# Patient Record
Sex: FEMALE | Race: WHITE | NOT HISPANIC OR LATINO | Employment: FULL TIME | URBAN - METROPOLITAN AREA
[De-identification: names, ages, dates, MRNs, and addresses within clinical notes are randomized per-mention and may not be internally consistent; named-entity substitution may affect disease eponyms.]

---

## 2019-08-28 ENCOUNTER — OFFICE VISIT (OUTPATIENT)
Dept: OBGYN CLINIC | Facility: CLINIC | Age: 39
End: 2019-08-28
Payer: COMMERCIAL

## 2019-08-28 VITALS
SYSTOLIC BLOOD PRESSURE: 122 MMHG | HEIGHT: 61 IN | BODY MASS INDEX: 20.86 KG/M2 | WEIGHT: 110.5 LBS | DIASTOLIC BLOOD PRESSURE: 72 MMHG

## 2019-08-28 DIAGNOSIS — Z12.4 ROUTINE CERVICAL SMEAR: ICD-10-CM

## 2019-08-28 DIAGNOSIS — Z11.51 SCREENING FOR HPV (HUMAN PAPILLOMAVIRUS): ICD-10-CM

## 2019-08-28 DIAGNOSIS — Z01.419 ENCOUNTER FOR WELL WOMAN EXAM: Primary | ICD-10-CM

## 2019-08-28 PROCEDURE — 99385 PREV VISIT NEW AGE 18-39: CPT | Performed by: PHYSICIAN ASSISTANT

## 2019-08-28 NOTE — PROGRESS NOTES
Assessment/Plan:    No problem-specific Assessment & Plan notes found for this encounter  Diagnoses and all orders for this visit:    Encounter for well woman exam    Routine cervical smear  -     GP Pap Dependent HPV Cotest >= 27years old    Screening for HPV (human papillomavirus)  -     GP Pap Dependent HPV Cotest >= 27years old          Subjective:      Patient ID: Porter Wild is a 45 y o  female  Pt presents for her annual exam today--  She has no complaints  She has regular, light bleeding, no pelvic pain  Bowel and bladder are regular  No breast concerns today    pap today  (period started last night)        The following portions of the patient's history were reviewed and updated as appropriate: allergies, current medications, past family history, past medical history, past social history, past surgical history and problem list     Review of Systems   Constitutional: Negative for chills, fever and unexpected weight change  Gastrointestinal: Negative for abdominal pain, blood in stool, constipation and diarrhea  Genitourinary: Negative  Objective:      /72 (BP Location: Left arm, Patient Position: Sitting, Cuff Size: Standard)   Ht 5' 0 63" (1 54 m)   Wt 50 1 kg (110 lb 8 oz)   LMP 08/28/2019 (Exact Date)   BMI 21 13 kg/m²          Physical Exam   Constitutional: She appears well-developed and well-nourished  HENT:   Head: Normocephalic and atraumatic  Neck: Normal range of motion  Pulmonary/Chest: Right breast exhibits no inverted nipple, no mass, no nipple discharge and no skin change  Left breast exhibits no inverted nipple, no mass, no nipple discharge and no skin change  Abdominal: Soft  Genitourinary: Vagina normal and uterus normal  Pelvic exam was performed with patient supine  There is no rash, tenderness or lesion on the right labia  There is no rash, tenderness or lesion on the left labia  Cervix exhibits discharge (active BRB)   Cervix exhibits no motion tenderness and no friability  Right adnexum displays no mass, no tenderness and no fullness  Left adnexum displays no mass, no tenderness and no fullness  Lymphadenopathy: No inguinal adenopathy noted on the right or left side  Nursing note and vitals reviewed

## 2019-08-28 NOTE — PROGRESS NOTES
The patient is here for a yearly  The patient has regular periods  No vaginal or urinary issues  No breast concerns  The patient used to live in [place], [place] and has not been to a gynecologist for five years

## 2019-09-03 LAB
HPV HR 12 DNA CVX QL NAA+PROBE: NOT DETECTED
HPV16 DNA SPEC QL NAA+PROBE: NOT DETECTED
HPV18 DNA SPEC QL NAA+PROBE: NOT DETECTED
THIN PREP CVX: NORMAL

## 2020-09-09 ENCOUNTER — ANNUAL EXAM (OUTPATIENT)
Dept: OBGYN CLINIC | Facility: CLINIC | Age: 40
End: 2020-09-09
Payer: COMMERCIAL

## 2020-09-09 VITALS
HEIGHT: 60 IN | BODY MASS INDEX: 21.6 KG/M2 | SYSTOLIC BLOOD PRESSURE: 110 MMHG | DIASTOLIC BLOOD PRESSURE: 68 MMHG | WEIGHT: 110 LBS

## 2020-09-09 DIAGNOSIS — Z12.39 ENCOUNTER FOR SCREENING BREAST EXAMINATION: ICD-10-CM

## 2020-09-09 DIAGNOSIS — Z01.419 ENCOUNTER FOR WELL WOMAN EXAM: Primary | ICD-10-CM

## 2020-09-09 PROCEDURE — 99395 PREV VISIT EST AGE 18-39: CPT | Performed by: PHYSICIAN ASSISTANT

## 2020-09-09 RX ORDER — DIPHENOXYLATE HYDROCHLORIDE AND ATROPINE SULFATE 2.5; .025 MG/1; MG/1
1 TABLET ORAL DAILY
COMMUNITY

## 2020-09-09 NOTE — PROGRESS NOTES
Assessment/Plan:    No problem-specific Assessment & Plan notes found for this encounter  Diagnoses and all orders for this visit:    Encounter for well woman exam    Encounter for screening breast examination    Other orders  -     multivitamin (THERAGRAN) TABS; Take 1 tablet by mouth daily          Subjective:      Patient ID: River Burris is a 44 y o  female  Pt presents for her annual exam today--  She has no complaints  She has regular bleeding, no pelvic pain  Bowel and bladder are regular  No breast concerns today  Last mammo--will be due for 1st this year    No pap today  rx--will take at next OV  Daily mvi      The following portions of the patient's history were reviewed and updated as appropriate: allergies, current medications, past family history, past medical history, past social history, past surgical history and problem list     Review of Systems   Constitutional: Negative for chills, fever and unexpected weight change  Gastrointestinal: Negative for abdominal pain, blood in stool, constipation and diarrhea  Genitourinary: Negative  Objective:      /68   Ht 5' (1 524 m)   Wt 49 9 kg (110 lb)   LMP 09/09/2020   BMI 21 48 kg/m²          Physical Exam  Vitals signs and nursing note reviewed  Constitutional:       Appearance: She is well-developed  HENT:      Head: Normocephalic and atraumatic  Neck:      Musculoskeletal: Normal range of motion  Chest:      Breasts:         Right: No inverted nipple, mass, nipple discharge or skin change  Left: No inverted nipple, mass, nipple discharge or skin change  Abdominal:      Palpations: Abdomen is soft  Genitourinary:     Exam position: Supine  Labia:         Right: No rash, tenderness or lesion  Left: No rash, tenderness or lesion  Vagina: Normal       Cervix: No cervical motion tenderness, discharge or friability  Adnexa:         Right: No mass, tenderness or fullness  Left: No mass, tenderness or fullness  Lymphadenopathy:      Lower Body: No right inguinal adenopathy  No left inguinal adenopathy

## 2020-09-09 NOTE — PROGRESS NOTES
Patient is here for yearly exam  Patient is having regular cycles, no breast concerns and B&B ok  Patient is not due for a pap smear at this visit  8/28/19 Normal Pap, Negative HPV

## 2021-04-01 DIAGNOSIS — Z23 ENCOUNTER FOR IMMUNIZATION: ICD-10-CM

## 2021-04-12 ENCOUNTER — IMMUNIZATIONS (OUTPATIENT)
Dept: FAMILY MEDICINE CLINIC | Facility: HOSPITAL | Age: 41
End: 2021-04-12

## 2021-04-12 DIAGNOSIS — Z23 ENCOUNTER FOR IMMUNIZATION: Primary | ICD-10-CM

## 2021-04-12 PROCEDURE — 91300 SARS-COV-2 / COVID-19 MRNA VACCINE (PFIZER-BIONTECH) 30 MCG: CPT

## 2021-04-12 PROCEDURE — 0001A SARS-COV-2 / COVID-19 MRNA VACCINE (PFIZER-BIONTECH) 30 MCG: CPT

## 2021-05-03 ENCOUNTER — IMMUNIZATIONS (OUTPATIENT)
Dept: FAMILY MEDICINE CLINIC | Facility: HOSPITAL | Age: 41
End: 2021-05-03

## 2021-05-03 DIAGNOSIS — Z23 ENCOUNTER FOR IMMUNIZATION: Primary | ICD-10-CM

## 2021-05-03 PROCEDURE — 91300 SARS-COV-2 / COVID-19 MRNA VACCINE (PFIZER-BIONTECH) 30 MCG: CPT

## 2021-05-03 PROCEDURE — 0002A SARS-COV-2 / COVID-19 MRNA VACCINE (PFIZER-BIONTECH) 30 MCG: CPT

## 2021-08-26 ENCOUNTER — OFFICE VISIT (OUTPATIENT)
Dept: PHYSICAL THERAPY | Facility: CLINIC | Age: 41
End: 2021-08-26
Payer: COMMERCIAL

## 2021-08-26 DIAGNOSIS — M25.561 ACUTE PAIN OF RIGHT KNEE: Primary | ICD-10-CM

## 2021-08-26 PROCEDURE — 97162 PT EVAL MOD COMPLEX 30 MIN: CPT | Performed by: PHYSICAL THERAPIST

## 2021-08-26 NOTE — PROGRESS NOTES
PT Evaluation     Today's date: 2021  Patient name: Shashank Madsen  : 1980  MRN: [id number]  Referring provider: Jennifer Mckeon MD  Dx:   Encounter Diagnosis     ICD-10-CM    1  Acute pain of right knee  M25 561                   Assessment  Assessment details: Pt presents with s/s consistent with R PFPS  Pt will benefit from PT to address impairments and restore function  Pt is utilizing her direct access benefits and will be referred to her PCP if pain is not resolved within 30 days  Impairments: abnormal gait, abnormal or restricted ROM, abnormal movement, activity intolerance, impaired physical strength, lacks appropriate home exercise program, pain with function and poor body mechanics    Goals  ST-4 weeks  1   Pt will decrease pain > 50%  2   Pt will restore full ROM  LT-6 weeks  1   Pt will decrease pain > 75%  2   Pt will return to running 6 mi without pain  Plan  Planned modality interventions: low level laser therapy  Planned therapy interventions: joint mobilization, manual therapy, abdominal trunk stabilization, neuromuscular re-education, patient education, postural training, strengthening, stretching, therapeutic activities, therapeutic exercise, therapeutic training, flexibility, functional ROM exercises, body mechanics training, gait training, home exercise program and graded activity  Frequency: 2x week  Duration in weeks: 4        Subjective Evaluation    History of Present Illness  Mechanism of injury: Pt is a 36 yof c/o an insidious onset of R medial knee pain that began 5 weeks ago after a long run  Pt has been running for 20 years and typically runs 4 days per week with an average mileage ranging from 25-30  Pt reports typically competing between 4-5 1/2 marathon's per year      Pain  Current pain ratin  At best pain ratin  At worst pain ratin  Quality: dull ache  Relieving factors: rest and medications  Aggravating factors: running and stair climbing      Diagnostic Tests  No diagnostic tests performed  Patient Goals  Patient goals for therapy: decreased edema, decreased pain, improved balance, increased motion, increased strength, independence with ADLs/IADLs and return to sport/leisure activities          Objective     Passive Range of Motion   Left Knee   Flexion: 150 degrees   Extension: -10 degrees     Right Knee   Flexion: 140 degrees with pain  Extension: -2 degrees     Mobility   Patellar Mobility:   Left Knee   WFL: medial, lateral, superior and inferior  Right Knee   WFL: medial, lateral, superior and inferior    Strength/Myotome Testing     Left Knee   Flexion: 5  Extension: 5  Quadriceps contraction: good    Right Knee   Flexion: 4  Extension: 4-  Quadriceps contraction: good    Additional Strength Details  Gait: Pt demonstrated mild R hip ER with a medial heel whip during late stance  Callus formation on R foot indicates mid-foot push-off  Tests     Right Knee   Positive patella-femoral grind, Thessaly's test at 20 degrees and valgus stress test at 0 degrees  Negative lateral Kimberlee, medial Kimberlee, patellar compression and Thessaly's test at 5 degrees  Dx: R PFPS        Manuals 8/26            Laser R knee  4000J            Medial patella taping                                       Neuro Re-Ed             DLS standing hip ABD 3x10 ea            DL ecc heel raises: even push-off 1x10            Standing GA stretch maintaining arch height 15"x3            DLS: SLR 0N58 ea                                                   Ther Ex             SL hip ABD             DL squat                                                                                           Ther Activity                                       Gait Training             Alter G running                          Modalities

## 2021-08-31 ENCOUNTER — APPOINTMENT (OUTPATIENT)
Dept: PHYSICAL THERAPY | Facility: CLINIC | Age: 41
End: 2021-08-31
Payer: COMMERCIAL

## 2021-09-02 ENCOUNTER — OFFICE VISIT (OUTPATIENT)
Dept: PHYSICAL THERAPY | Facility: CLINIC | Age: 41
End: 2021-09-02
Payer: COMMERCIAL

## 2021-09-02 DIAGNOSIS — M25.561 ACUTE PAIN OF RIGHT KNEE: Primary | ICD-10-CM

## 2021-09-02 PROCEDURE — 97112 NEUROMUSCULAR REEDUCATION: CPT | Performed by: PHYSICAL THERAPIST

## 2021-09-02 PROCEDURE — 97116 GAIT TRAINING THERAPY: CPT | Performed by: PHYSICAL THERAPIST

## 2021-09-02 PROCEDURE — 97140 MANUAL THERAPY 1/> REGIONS: CPT | Performed by: PHYSICAL THERAPIST

## 2021-09-02 NOTE — PROGRESS NOTES
Daily Note     Today's date: 2021  Patient name: Caridad Louise  : 1980  MRN: [id number]  Referring provider: Vidhi Alberts MD  Dx:   Encounter Diagnosis     ICD-10-CM    1  Acute pain of right knee  M25 561                   Subjective: Pt reports good compliance with HEP  Pt reports 8/10 R knee pain first thing in the morning when transitioning from flexion to extension  Pt reports pain resolves after several hours, but still has pain with descending stairs and with sitting at her desk > 15-20 min  Objective: See treatment diary below  Running assessment performed  Assessment: Pt demonstrated a R medial heel whip, lateral hip instability B, a heel IC and posterior COG  Plan: Cont POC  Dx: R PFPS        Manuals            Laser R knee  4000J 5000J           Medial patella taping                                       Neuro Re-Ed             DLS standing hip ABD 3x10 ea 3x10 ea           DL ecc heel raises: even push-off 1x10 3x10           Standing GA stretch maintaining arch height 15"x3 15"x3           DLS: SLR 8K78 ea 6S02 ea                                                  Ther Ex             SL hip ABD             DL squat  3x10                                                                                         Ther Activity                                       Gait Training             Alter G running  12 min @ 65 % BW x 5 5 mph           TM  5 min x 5 5 mph           Modalities

## 2021-09-09 ENCOUNTER — OFFICE VISIT (OUTPATIENT)
Dept: PHYSICAL THERAPY | Facility: CLINIC | Age: 41
End: 2021-09-09
Payer: COMMERCIAL

## 2021-09-09 DIAGNOSIS — M25.561 ACUTE PAIN OF RIGHT KNEE: Primary | ICD-10-CM

## 2021-09-09 PROCEDURE — 97112 NEUROMUSCULAR REEDUCATION: CPT | Performed by: PHYSICAL THERAPIST

## 2021-09-09 PROCEDURE — 97116 GAIT TRAINING THERAPY: CPT | Performed by: PHYSICAL THERAPIST

## 2021-09-09 NOTE — PROGRESS NOTES
Daily Note     Today's date: 2021  Patient name: Wagner Gilbert  : 1980  MRN: [id number]  Referring provider: Brynn Serrato MD  Dx:   Encounter Diagnosis     ICD-10-CM    1  Acute pain of right knee  M25 561                   Subjective: Pt reports intermittent periods of less pain after prolonged sitting, 6/10 R ant knee pain at worst first thing in the morning  Objective: See treatment diary below  Assessment: Decreased SLR reps secondary to 3+ days of muscle soreness  Functionally, pt's L LE appears longer, issued a 1/3 cm heel lift  Plan: Cont POC  Assess response to heel lift  Dx: R PFPS        Manuals           Laser R knee  4000J 5000J 5000J          Medial patella taping   3 min                                    Neuro Re-Ed             DLS standing hip ABD 3x10 ea 3x10 ea 3x12 ea          DL ecc heel raises: even push-off 1x10 3x10 SL 2x10          Standing GA stretch maintaining arch height 15"x3 15"x3 15"x3          DLS: SLR 3C81 ea 8I00 ea 3x20 ea          bridges   3x10                                     Ther Ex             SL hip ABD             DL squat  3x10 3x10                                                                                        Ther Activity                                       Gait Training             Alter G running  12 min @ 65 % BW x 5 5 mph 12 min @ 60 % BW x 5 5 mph           TM  5 min x 5 5 mph           Modalities

## 2021-09-13 ENCOUNTER — OFFICE VISIT (OUTPATIENT)
Dept: PHYSICAL THERAPY | Facility: CLINIC | Age: 41
End: 2021-09-13
Payer: COMMERCIAL

## 2021-09-13 DIAGNOSIS — M25.561 ACUTE PAIN OF RIGHT KNEE: Primary | ICD-10-CM

## 2021-09-13 PROCEDURE — 97116 GAIT TRAINING THERAPY: CPT | Performed by: PHYSICAL THERAPIST

## 2021-09-13 PROCEDURE — 97112 NEUROMUSCULAR REEDUCATION: CPT | Performed by: PHYSICAL THERAPIST

## 2021-09-13 NOTE — PROGRESS NOTES
Daily Note     Today's date: 2021  Patient name: Sonam Valdez  : 1980  MRN: [id number]  Referring provider: Sebastian Bledsoe MD  Dx:   Encounter Diagnosis     ICD-10-CM    1  Acute pain of right knee  M25 561                   Subjective: Pt reports 3/10 pain currently  Pt reports a slight decrease in pain and tightness after using the heel lift in R shoe  Pt continues to report her worst pain first thing in the morning  Objective: See treatment diary below  Assessment: Added patella mobs instead of taping secondary to excessive soreness first thing in the morning while wearing taping  Plan: Cont POC  Assess response to patella mob, added heelslides nv  Dx: R PFPS        Manuals          Laser R knee  4000J 5000J 5000J 5000J         Medial patella taping   3 min D/c'd         Patella Gr IV medial, inf mobs    1x30 ea                      Neuro Re-Ed             DLS standing hip ABD 3x10 ea 3x10 ea 3x12 ea 3x15 ea         DL ecc heel raises: even push-off 1x10 3x10 SL 2x10 DL con/ SL ecc 3x10         Standing GA stretch maintaining arch height 15"x3 15"x3 15"x3 15"x3         DLS: SLR 9X01 ea 6A46 ea 3x20 ea 3x20 ea         bridges   3x10  3x12                                   Ther Ex             SL hip ABD             DL squat  3x10 3x10 3x10         heelslides    nv                                                                          Ther Activity                                       Gait Training             Alter G running  12 min @ 65 % BW x 5 5 mph 12 min @ 60 % BW x 5 5 mph           TM  5 min x 5 5 mph           Modalities

## 2021-09-15 ENCOUNTER — ANNUAL EXAM (OUTPATIENT)
Dept: OBGYN CLINIC | Facility: CLINIC | Age: 41
End: 2021-09-15
Payer: COMMERCIAL

## 2021-09-15 VITALS
WEIGHT: 101 LBS | SYSTOLIC BLOOD PRESSURE: 106 MMHG | BODY MASS INDEX: 19.83 KG/M2 | HEIGHT: 60 IN | DIASTOLIC BLOOD PRESSURE: 64 MMHG

## 2021-09-15 DIAGNOSIS — Z01.419 ENCOUNTER FOR WELL WOMAN EXAM: Primary | ICD-10-CM

## 2021-09-15 DIAGNOSIS — Z12.39 ENCOUNTER FOR SCREENING BREAST EXAMINATION: ICD-10-CM

## 2021-09-15 DIAGNOSIS — Z12.31 ENCOUNTER FOR SCREENING MAMMOGRAM FOR BREAST CANCER: ICD-10-CM

## 2021-09-15 PROCEDURE — 99396 PREV VISIT EST AGE 40-64: CPT | Performed by: PHYSICIAN ASSISTANT

## 2021-09-15 NOTE — PROGRESS NOTES
Assessment/Plan:    No problem-specific Assessment & Plan notes found for this encounter  Diagnoses and all orders for this visit:    Encounter for well woman exam    Encounter for screening breast examination    Encounter for screening mammogram for breast cancer  -     Mammo screening bilateral w 3d & cad; Future          Subjective:      Patient ID: Denver Hamburg is a 36 y o  female  Pt presents for her annual exam today--  She has no complaints  No changes  She has regular bleeding  no pelvic pain  Bowel and bladder are regular  No breast concerns today  Last mammo--due for 1st      No pap today  rx mammo--  Daily mvi      The following portions of the patient's history were reviewed and updated as appropriate: allergies, current medications, past family history, past medical history, past social history, past surgical history and problem list     Review of Systems   Constitutional: Negative for chills, fever and unexpected weight change  Gastrointestinal: Negative for abdominal pain, blood in stool, constipation and diarrhea  Genitourinary: Negative  Objective:      /64   Ht 5' (1 524 m)   Wt 45 8 kg (101 lb)   LMP 09/01/2021 (Exact Date)   Breastfeeding No   BMI 19 73 kg/m²          Physical Exam  Vitals and nursing note reviewed  Constitutional:       Appearance: She is well-developed  HENT:      Head: Normocephalic and atraumatic  Chest:      Breasts:         Right: No inverted nipple, mass, nipple discharge or skin change  Left: No inverted nipple, mass, nipple discharge or skin change  Abdominal:      Palpations: Abdomen is soft  Genitourinary:     Exam position: Supine  Labia:         Right: No rash, tenderness or lesion  Left: No rash, tenderness or lesion  Vagina: Normal       Cervix: No cervical motion tenderness, discharge or friability  Adnexa:         Right: No mass, tenderness or fullness            Left: No mass, tenderness or fullness  Musculoskeletal:      Cervical back: Normal range of motion  Lymphadenopathy:      Lower Body: No right inguinal adenopathy  No left inguinal adenopathy

## 2021-09-15 NOTE — PROGRESS NOTES
Patient is here for yearly exam   Patient is having regular cycle  She has no breast concerns and B&B ok  Patient is not due for a pap smear at this visit  8/28/19 Normal Pap, Negative HPV

## 2021-09-16 ENCOUNTER — OFFICE VISIT (OUTPATIENT)
Dept: PHYSICAL THERAPY | Facility: CLINIC | Age: 41
End: 2021-09-16
Payer: COMMERCIAL

## 2021-09-16 DIAGNOSIS — M25.561 ACUTE PAIN OF RIGHT KNEE: Primary | ICD-10-CM

## 2021-09-16 PROCEDURE — 97112 NEUROMUSCULAR REEDUCATION: CPT | Performed by: PHYSICAL THERAPIST

## 2021-09-16 PROCEDURE — 97116 GAIT TRAINING THERAPY: CPT | Performed by: PHYSICAL THERAPIST

## 2021-09-16 NOTE — PROGRESS NOTES
Daily Note     Today's date: 2021  Patient name: Gosia Clayton  : 1980  MRN: [id number]  Referring provider: Hiren Pisano MD  Dx:   Encounter Diagnosis     ICD-10-CM    1  Acute pain of right knee  M25 561                   Subjective: Pt reports improved R knee pain first thing in the morning after adding heelslides  Objective: See treatment diary below  Assessment:  Pt demonstrated improved knee flexion and symmetrical gait pattern  Plan: Cont POC  Dx: R PFPS        Manuals         Laser R knee  4000J 5000J 5000J 5000J 5000J        Medial patella taping   3 min D/c'd         Patella Gr IV medial, inf mobs    1x30 ea                      Neuro Re-Ed             DLS standing hip ABD 3x10 ea 3x10 ea 3x12 ea 3x15 ea 2# ea/ off step 3x10        DL ecc heel raises: even push-off 1x10 3x10 SL 2x10 DL con/ SL ecc 3x10         Standing GA stretch maintaining arch height 15"x3 15"x3 15"x3 15"x3         DLS: SLR 4M99 ea 6K32 ea 3x20 ea 3x20 ea 3x20        bridges   3x10  3x12 1x20        Bridges with a kick     3x10                     Ther Ex             bike     L1 5 min        SL hip ABD     3x15        DL squat  3x10 3x10 3x10         heelslides    nv 5"x15        Prone quad stetch     15"x3                                                             Ther Activity                                       Gait Training             Alter G running  12 min @ 65 % BW x 5 5 mph 12 min @ 60 % BW x 5 5 mph           TM  5 min x 5 5 mph           Modalities

## 2021-09-20 ENCOUNTER — OFFICE VISIT (OUTPATIENT)
Dept: PHYSICAL THERAPY | Facility: CLINIC | Age: 41
End: 2021-09-20
Payer: COMMERCIAL

## 2021-09-20 DIAGNOSIS — M25.561 ACUTE PAIN OF RIGHT KNEE: Primary | ICD-10-CM

## 2021-09-20 PROCEDURE — 97112 NEUROMUSCULAR REEDUCATION: CPT | Performed by: PHYSICAL THERAPIST

## 2021-09-20 NOTE — PROGRESS NOTES
Daily Note     Today's date: 2021  Patient name: Yamile Patrick  : 1980  MRN: [id number]  Referring provider: Gabe Cardona MD  Dx:   Encounter Diagnosis     ICD-10-CM    1  Acute pain of right knee  M25 561                   Subjective: Pt reports increased pain and stiffness for the past 2 days  Pt reports shoulder and finger swelling and pain over the past few months as well  Pt was tested and found negative for Lyme's disease and Rhuematoid arthritis  Objective: See treatment diary below  Assessment:  Pt demonstrated full ROM, improving hip and quad strength without a change in sx  Plan: Cont POC  Dx: R PFPS        Manuals        Laser R knee  4000J 5000J 5000J 5000J 5000J 5000J       Medial patella taping   3 min D/c'd         Patella Gr IV medial, inf mobs    1x30 ea         Knee flex MWM      2x10       Neuro Re-Ed             DLS standing hip ABD 3x10 ea 3x10 ea 3x12 ea 3x15 ea 2# ea/ off step 3x10 2#/ 3x15 no hands       DL ecc heel raises: even push-off 1x10 3x10 SL 2x10 DL con/ SL ecc 3x10  DL/SL ecc 3x10       Standing GA stretch maintaining arch height 15"x3 15"x3 15"x3 15"x3  15"x3       SL hip ABD      2#/ 3x15       clamshells      Hector/ 3x15       DLS: SLR 0B43 ea 3M25 ea 3x20 ea 3x20 ea 3x20 2#/ 3x15       bridges   3x10  3x12 1x20 10#/ 3x15       Bridges with a kick     3x10                     Ther Ex             bike     L1 5 min L1 5 min       SL hip ABD     3x15        DL squat  3x10 3x10 3x10         heelslides    nv 5"x15 5"x15       Prone quad stetch     15"x3                                                             Ther Activity                                       Gait Training             Alter G running  12 min @ 65 % BW x 5 5 mph 12 min @ 60 % BW x 5 5 mph           TM  5 min x 5 5 mph           Modalities

## 2021-09-23 ENCOUNTER — OFFICE VISIT (OUTPATIENT)
Dept: PHYSICAL THERAPY | Facility: CLINIC | Age: 41
End: 2021-09-23
Payer: COMMERCIAL

## 2021-09-23 DIAGNOSIS — M25.561 ACUTE PAIN OF RIGHT KNEE: Primary | ICD-10-CM

## 2021-09-23 PROCEDURE — 97116 GAIT TRAINING THERAPY: CPT | Performed by: PHYSICAL THERAPIST

## 2021-09-23 PROCEDURE — 97140 MANUAL THERAPY 1/> REGIONS: CPT | Performed by: PHYSICAL THERAPIST

## 2021-09-23 PROCEDURE — 97110 THERAPEUTIC EXERCISES: CPT | Performed by: PHYSICAL THERAPIST

## 2021-09-23 NOTE — PROGRESS NOTES
Daily Note     Today's date: 2021  Patient name: Porter Wild  : 1980  MRN: [id number]  Referring provider: Miguel Willis MD  Dx:   Encounter Diagnosis     ICD-10-CM    1  Acute pain of right knee  M25 561                   Subjective: Pt reports improved stiffness and pain since last visit  Objective: See treatment diary below  Assessment:  Pt demonstrated the most symmetrical gait pattern since beginning PT but still demonstrated minimally restricted R knee flexion during swing  Plan: Cont POC  Dx: R PFPS        Manuals       Laser R knee  4000J 5000J 5000J 5000J 5000J 5000J 5000J      Medial patella taping   3 min D/c'd         Patella Gr IV medial, inf mobs    1x30 ea   1x30      Knee flex MWM      2x10 2x10      Neuro Re-Ed             DLS standing hip ABD 3x10 ea 3x10 ea 3x12 ea 3x15 ea 2# ea/ off step 3x10 2#/ 3x15 no hands       DL ecc heel raises: even push-off 1x10 3x10 SL 2x10 DL con/ SL ecc 3x10  DL/SL ecc 3x10       Standing GA stretch maintaining arch height 15"x3 15"x3 15"x3 15"x3  15"x3                                 DLS: SLR 3Q18 ea 9I14 ea 3x20 ea 3x20 ea 3x20 2#/ 3x15 2 5#/ 3x15      bridges   3x10  3x12 1x20 10#/ 3x15       Bridges with a kick     3x10                     Ther Ex             bike     L1 5 min L1 5 min L1 x 5 min      SL hip ABD     3x15  2 5#/ 3x15      DL squat  3x10 3x10 3x10         heelslides    nv 5"x15 5"x15 5"x15      Prone quad stetch     15"x3         clamshells       Hector/ 3x15                                             Ther Activity                                       Gait Training             Alter G running  12 min @ 65 % BW x 5 5 mph 12 min @ 60 % BW x 5 5 mph     12 min @ 65 % BW 5 5 MPH      TM  5 min x 5 5 mph           Modalities

## 2021-09-28 ENCOUNTER — OFFICE VISIT (OUTPATIENT)
Dept: PHYSICAL THERAPY | Facility: CLINIC | Age: 41
End: 2021-09-28
Payer: COMMERCIAL

## 2021-09-28 DIAGNOSIS — M25.561 ACUTE PAIN OF RIGHT KNEE: Primary | ICD-10-CM

## 2021-09-28 PROCEDURE — 97530 THERAPEUTIC ACTIVITIES: CPT | Performed by: PHYSICAL THERAPIST

## 2021-09-28 PROCEDURE — 97140 MANUAL THERAPY 1/> REGIONS: CPT | Performed by: PHYSICAL THERAPIST

## 2021-09-28 PROCEDURE — 97110 THERAPEUTIC EXERCISES: CPT | Performed by: PHYSICAL THERAPIST

## 2021-09-28 NOTE — PROGRESS NOTES
Daily Note     Today's date: 2021  Patient name: Roly Helms  : 1980  MRN: [id number]  Referring provider: Yuly Loera MD  Dx:   Encounter Diagnosis     ICD-10-CM    1  Acute pain of right knee  M25 561                   Subjective: Pt reports a failure to improve significantly since beginning PT 3 and 1/2 weeks ago  Pt reports stiffness in the morning, pain with descending stairs and pain t/o the day with STS after prolonged sitting  Pt is more significant without Ibeprofen  Objective: See treatment diary below  Assessment:  Pt demonstrated (-) medial Kimberlee, (-) Jaron's sign and patella compression  Pt's pain is across her patella and at her medial joint line  Pt was recommended to f/u with PCP and would benefit from imaging to determine if articular cartilage or meniscus tear is present  Plan: Cont POC  Dx: R PFPS        Manuals      Laser R knee  4000J 5000J 5000J 5000J 5000J 5000J 5000J 6000J     Medial patella taping   3 min D/c'd         Patella Gr IV medial, inf mobs    1x30 ea   1x30 1x30     Knee flex MWM      2x10 2x10 2x10     Standing knee flexion MWM        1x10     Neuro Re-Ed             DLS standing hip ABD 3x10 ea 3x10 ea 3x12 ea 3x15 ea 2# ea/ off step 3x10 2#/ 3x15 no hands       DL ecc heel raises: even push-off 1x10 3x10 SL 2x10 DL con/ SL ecc 3x10  DL/SL ecc 3x10  DL/SL 3x10     Standing GA stretch maintaining arch height 15"x3 15"x3 15"x3 15"x3  15"x3  15"x3                               DLS: SLR 6E59 ea 9J02 ea 3x20 ea 3x20 ea 3x20 2#/ 3x15 2 5#/ 3x15      bridges   3x10  3x12 1x20 10#/ 3x15       Bridges with a kick     3x10                     Ther Ex             bike     L1 5 min L1 5 min L1 x 5 min L1 x 5 min     SL hip ABD     3x15  2 5#/ 3x15      DL squat  3x10 3x10 3x10         heelslides    nv 5"x15 5"x15 5"x15 5"x15     Prone quad stetch     15"x3         clamshells       Hector/ 3x15 Ther Activity             ecc step-downs        4"/ 1x10     ecc step-downs        6"/ 3x10     Gait Training             Alter G running  12 min @ 65 % BW x 5 5 mph 12 min @ 60 % BW x 5 5 mph     12 min @ 65 % BW 5 5 MPH      TM  5 min x 5 5 mph           Modalities

## 2021-09-30 ENCOUNTER — OFFICE VISIT (OUTPATIENT)
Dept: PHYSICAL THERAPY | Facility: CLINIC | Age: 41
End: 2021-09-30
Payer: COMMERCIAL

## 2021-09-30 DIAGNOSIS — M25.561 ACUTE PAIN OF RIGHT KNEE: Primary | ICD-10-CM

## 2021-09-30 PROCEDURE — 97530 THERAPEUTIC ACTIVITIES: CPT | Performed by: PHYSICAL THERAPIST

## 2021-09-30 PROCEDURE — 97140 MANUAL THERAPY 1/> REGIONS: CPT | Performed by: PHYSICAL THERAPIST

## 2021-09-30 PROCEDURE — 97110 THERAPEUTIC EXERCISES: CPT | Performed by: PHYSICAL THERAPIST

## 2021-10-05 ENCOUNTER — OFFICE VISIT (OUTPATIENT)
Dept: PHYSICAL THERAPY | Facility: CLINIC | Age: 41
End: 2021-10-05
Payer: COMMERCIAL

## 2021-10-05 ENCOUNTER — TRANSCRIBE ORDERS (OUTPATIENT)
Dept: URGENT CARE | Facility: MEDICAL CENTER | Age: 41
End: 2021-10-05

## 2021-10-05 ENCOUNTER — APPOINTMENT (OUTPATIENT)
Dept: RADIOLOGY | Facility: MEDICAL CENTER | Age: 41
End: 2021-10-05
Payer: COMMERCIAL

## 2021-10-05 DIAGNOSIS — M25.561 ACUTE PAIN OF RIGHT KNEE: ICD-10-CM

## 2021-10-05 DIAGNOSIS — M25.561 ACUTE PAIN OF RIGHT KNEE: Primary | ICD-10-CM

## 2021-10-05 PROCEDURE — 97140 MANUAL THERAPY 1/> REGIONS: CPT | Performed by: PHYSICAL THERAPIST

## 2021-10-05 PROCEDURE — 97110 THERAPEUTIC EXERCISES: CPT | Performed by: PHYSICAL THERAPIST

## 2021-10-05 PROCEDURE — 73562 X-RAY EXAM OF KNEE 3: CPT

## 2021-10-07 ENCOUNTER — OFFICE VISIT (OUTPATIENT)
Dept: PHYSICAL THERAPY | Facility: CLINIC | Age: 41
End: 2021-10-07
Payer: COMMERCIAL

## 2021-10-07 DIAGNOSIS — M25.561 ACUTE PAIN OF RIGHT KNEE: Primary | ICD-10-CM

## 2021-10-07 PROCEDURE — 97140 MANUAL THERAPY 1/> REGIONS: CPT | Performed by: PHYSICAL THERAPIST

## 2021-10-07 PROCEDURE — 97112 NEUROMUSCULAR REEDUCATION: CPT | Performed by: PHYSICAL THERAPIST

## 2021-10-07 PROCEDURE — 97110 THERAPEUTIC EXERCISES: CPT | Performed by: PHYSICAL THERAPIST

## 2022-02-07 ENCOUNTER — HOSPITAL ENCOUNTER (OUTPATIENT)
Dept: MAMMOGRAPHY | Facility: HOSPITAL | Age: 42
Discharge: HOME | End: 2022-02-07
Payer: COMMERCIAL

## 2022-02-07 VITALS — BODY MASS INDEX: 19.82 KG/M2 | WEIGHT: 100.97 LBS | HEIGHT: 60 IN

## 2022-02-07 DIAGNOSIS — Z12.31 ENCOUNTER FOR SCREENING MAMMOGRAM FOR BREAST CANCER: ICD-10-CM

## 2022-02-07 PROCEDURE — 77063 BREAST TOMOSYNTHESIS BI: CPT

## 2022-02-07 PROCEDURE — 77067 SCR MAMMO BI INCL CAD: CPT

## 2022-02-23 ENCOUNTER — HOSPITAL ENCOUNTER (OUTPATIENT)
Dept: ULTRASOUND IMAGING | Facility: CLINIC | Age: 42
Discharge: HOME | End: 2022-02-23
Payer: COMMERCIAL

## 2022-02-23 ENCOUNTER — HOSPITAL ENCOUNTER (OUTPATIENT)
Dept: MAMMOGRAPHY | Facility: CLINIC | Age: 42
Discharge: HOME | End: 2022-02-23
Payer: COMMERCIAL

## 2022-02-23 VITALS — WEIGHT: 100 LBS | BODY MASS INDEX: 19.63 KG/M2 | HEIGHT: 60 IN

## 2022-02-23 DIAGNOSIS — R92.8 ABNORMAL MAMMOGRAM: ICD-10-CM

## 2022-02-23 PROCEDURE — 76642 ULTRASOUND BREAST LIMITED: CPT

## 2022-02-23 PROCEDURE — 77065 DX MAMMO INCL CAD UNI: CPT

## 2022-02-23 PROCEDURE — G0279 TOMOSYNTHESIS, MAMMO: HCPCS

## 2022-08-23 ENCOUNTER — HOSPITAL ENCOUNTER (OUTPATIENT)
Dept: ULTRASOUND IMAGING | Facility: CLINIC | Age: 42
Discharge: HOME | End: 2022-08-23
Payer: COMMERCIAL

## 2022-08-23 DIAGNOSIS — R92.8 MAMMOGRAM ABNORMAL: ICD-10-CM

## 2022-08-23 PROCEDURE — 76642 ULTRASOUND BREAST LIMITED: CPT

## 2022-10-12 ENCOUNTER — ANNUAL EXAM (OUTPATIENT)
Dept: GYNECOLOGY | Facility: CLINIC | Age: 42
End: 2022-10-12
Payer: COMMERCIAL

## 2022-10-12 VITALS
SYSTOLIC BLOOD PRESSURE: 130 MMHG | WEIGHT: 112 LBS | BODY MASS INDEX: 21.99 KG/M2 | HEIGHT: 60 IN | DIASTOLIC BLOOD PRESSURE: 84 MMHG

## 2022-10-12 DIAGNOSIS — Z01.419 ENCOUNTER FOR WELL WOMAN EXAM: Primary | ICD-10-CM

## 2022-10-12 DIAGNOSIS — Z11.51 SCREENING FOR HPV (HUMAN PAPILLOMAVIRUS): ICD-10-CM

## 2022-10-12 DIAGNOSIS — Z12.4 CERVICAL CANCER SCREENING: ICD-10-CM

## 2022-10-12 DIAGNOSIS — Z01.419 ROUTINE GYNECOLOGICAL EXAMINATION: ICD-10-CM

## 2022-10-12 DIAGNOSIS — Z12.39 ENCOUNTER FOR SCREENING BREAST EXAMINATION: ICD-10-CM

## 2022-10-12 PROCEDURE — G0476 HPV COMBO ASSAY CA SCREEN: HCPCS | Performed by: PHYSICIAN ASSISTANT

## 2022-10-12 PROCEDURE — G0145 SCR C/V CYTO,THINLAYER,RESCR: HCPCS | Performed by: PHYSICIAN ASSISTANT

## 2022-10-12 PROCEDURE — 99396 PREV VISIT EST AGE 40-64: CPT | Performed by: PHYSICIAN ASSISTANT

## 2022-10-12 RX ORDER — METHOTREXATE 2.5 MG/1
TABLET ORAL
COMMUNITY
Start: 2022-08-11

## 2022-10-12 RX ORDER — HYDROQUINONE 40 MG/G
CREAM TOPICAL
COMMUNITY
Start: 2022-07-28

## 2022-10-12 RX ORDER — FOLIC ACID 1 MG/1
1000 TABLET ORAL DAILY
COMMUNITY
Start: 2022-07-30

## 2022-10-12 NOTE — PROGRESS NOTES
Assessment/Plan:    No problem-specific Assessment & Plan notes found for this encounter  Diagnoses and all orders for this visit:    Encounter for well woman exam    Encounter for screening breast examination    Cervical cancer screening    Screening for HPV (human papillomavirus)  -     Liquid-based pap, screening    Routine gynecological examination  -     Liquid-based pap, screening    Other orders  -     folic acid (FOLVITE) 1 mg tablet; Take 1,000 mcg by mouth daily  -     hydroquinone 4 % cream; APPLY TO DARK ON CHEEKS NIGHTLY FOR 2 MONTHS AND THEN STOP FOR 1 MONTH AND THEN REPEAT  -     methotrexate 2 5 MG tablet; TAKE 6 TABLETS EVERY WEEK BY ORAL ROUTE          Subjective:      Patient ID: Estrella Arroyo is a 43 y o  female  Pt presents for her annual exam today--  She has no gyn complaints  Dx with Inflammatory Polyarthropathy this year  She has regular bleeding  no pelvic pain  Bowel and bladder are regular  No breast concerns today  Last mammo--8/2022 ()--goes Q 6 to ensure stability of small, round regular mass      pap today  rx mamm  Daily mvi      The following portions of the patient's history were reviewed and updated as appropriate: allergies, current medications, past family history, past medical history, past social history, past surgical history and problem list     Review of Systems   Constitutional: Negative for chills, fever and unexpected weight change  Gastrointestinal: Negative for abdominal pain, blood in stool, constipation and diarrhea  Genitourinary: Negative  Objective:      /84   Ht 5' (1 524 m)   Wt 50 8 kg (112 lb)   LMP 10/03/2022 (Exact Date)   BMI 21 87 kg/m²          Physical Exam  Vitals and nursing note reviewed  Constitutional:       Appearance: She is well-developed  HENT:      Head: Normocephalic and atraumatic  Chest:   Breasts:      Right: No inverted nipple, mass, nipple discharge or skin change        Left: No inverted nipple, mass, nipple discharge or skin change  Abdominal:      Palpations: Abdomen is soft  Genitourinary:     Exam position: Supine  Labia:         Right: No rash, tenderness or lesion  Left: No rash, tenderness or lesion  Vagina: Normal       Cervix: No cervical motion tenderness, discharge or friability  Adnexa:         Right: No mass, tenderness or fullness  Left: No mass, tenderness or fullness  Musculoskeletal:      Cervical back: Normal range of motion  Lymphadenopathy:      Lower Body: No right inguinal adenopathy  No left inguinal adenopathy

## 2022-10-14 LAB
HPV HR 12 DNA CVX QL NAA+PROBE: NEGATIVE
HPV16 DNA CVX QL NAA+PROBE: NEGATIVE
HPV18 DNA CVX QL NAA+PROBE: NEGATIVE

## 2022-10-20 LAB
LAB AP GYN PRIMARY INTERPRETATION: NORMAL
Lab: NORMAL

## 2023-02-23 ENCOUNTER — HOSPITAL ENCOUNTER (OUTPATIENT)
Dept: MAMMOGRAPHY | Facility: CLINIC | Age: 43
End: 2023-02-23

## 2023-02-23 ENCOUNTER — HOSPITAL ENCOUNTER (OUTPATIENT)
Dept: ULTRASOUND IMAGING | Facility: CLINIC | Age: 43
End: 2023-02-23

## 2023-02-23 VITALS — BODY MASS INDEX: 21.99 KG/M2 | WEIGHT: 112 LBS | HEIGHT: 60 IN

## 2023-02-23 DIAGNOSIS — R92.8 ABNORMAL MAMMOGRAM: ICD-10-CM

## 2023-06-20 ENCOUNTER — OFFICE VISIT (OUTPATIENT)
Dept: URGENT CARE | Facility: MEDICAL CENTER | Age: 43
End: 2023-06-20
Payer: COMMERCIAL

## 2023-06-20 VITALS
SYSTOLIC BLOOD PRESSURE: 141 MMHG | HEART RATE: 74 BPM | RESPIRATION RATE: 18 BRPM | OXYGEN SATURATION: 98 % | TEMPERATURE: 98.2 F | DIASTOLIC BLOOD PRESSURE: 88 MMHG

## 2023-06-20 DIAGNOSIS — H00.015 HORDEOLUM EXTERNUM OF LEFT LOWER EYELID: Primary | ICD-10-CM

## 2023-06-20 PROCEDURE — 99213 OFFICE O/P EST LOW 20 MIN: CPT | Performed by: PHYSICIAN ASSISTANT

## 2023-06-20 RX ORDER — OFLOXACIN 3 MG/ML
1 SOLUTION/ DROPS OPHTHALMIC 4 TIMES DAILY
Qty: 5 ML | Refills: 0 | Status: SHIPPED | OUTPATIENT
Start: 2023-06-20 | End: 2023-06-27

## 2023-06-20 NOTE — PROGRESS NOTES
[facility]        NAME: Shawn Mckeon is a 43 y o  female  : 1980    MRN: [id number]  DATE: 2023  TIME: 6:17 PM    Assessment and Plan   Hordeolum externum of left lower eyelid [H00 015]  1  Hordeolum externum of left lower eyelid              Patient Instructions     Stye  Ocuflox as directed  Follow up with PCP in 3-5 days  Proceed to  ER if symptoms worsen  Chief Complaint   No chief complaint on file  History of Present Illness       44-year-old female who presents complaining of bumps on their lower eyelid  Patient states that she was using warm compresses which helped decrease the size but now she has 2 of them  Denies visual disturbances, trauma, foreign body  Review of Systems   Review of Systems   Constitutional: Negative  HENT: Negative  Eyes: Negative  Respiratory: Negative  Negative for cough, chest tightness, shortness of breath, wheezing and stridor  Cardiovascular: Negative  Negative for chest pain, palpitations and leg swelling  Current Medications       Current Outpatient Medications:   •  folic acid (FOLVITE) 1 mg tablet, Take 1,000 mcg by mouth daily, Disp: , Rfl:   •  hydroquinone 4 % cream, APPLY TO DARK ON CHEEKS NIGHTLY FOR 2 MONTHS AND THEN STOP FOR 1 MONTH AND THEN REPEAT, Disp: , Rfl:   •  methotrexate 2 5 MG tablet, TAKE 6 TABLETS EVERY WEEK BY ORAL ROUTE, Disp: , Rfl:   •  multivitamin (THERAGRAN) TABS, Take 1 tablet by mouth daily, Disp: , Rfl:     Current Allergies     Allergies as of 2023   • (No Known Allergies)            The following portions of the patient's history were reviewed and updated as appropriate: allergies, current medications, past family history, past medical history, past social history, past surgical history and problem list      Past Medical History:   Diagnosis Date   • Inflammatory polyarthropathy ([address] ) 2021       No past surgical history on file      Family History   Problem Relation Age of Onset   • No Known Problems Mother    • No Known Problems Father    • No Known Problems Maternal Grandmother    • No Known Problems Paternal Grandmother    • No Known Problems Sister    • No Known Problems Paternal Aunt    • No Known Problems Paternal Aunt    • Breast cancer Neg Hx          Medications have been verified  Objective   There were no vitals taken for this visit  Physical Exam     Physical Exam  Constitutional:       General: She is not in acute distress  Appearance: She is well-developed  She is not diaphoretic  HENT:      Head: Normocephalic and atraumatic  Right Ear: Hearing, tympanic membrane, ear canal and external ear normal       Left Ear: Hearing, tympanic membrane, ear canal and external ear normal       Mouth/Throat:      Pharynx: Uvula midline  Eyes:      General: Vision grossly intact  Gaze aligned appropriately  No allergic shiner, visual field deficit or scleral icterus  Right eye: No foreign body, discharge or hordeolum  Left eye: Hordeolum present  No foreign body or discharge  Extraocular Movements: Extraocular movements intact  Conjunctiva/sclera: Conjunctivae normal       Pupils: Pupils are equal, round, and reactive to light  Cardiovascular:      Rate and Rhythm: Normal rate and regular rhythm  Heart sounds: Normal heart sounds  Pulmonary:      Effort: Pulmonary effort is normal       Breath sounds: Normal breath sounds  Musculoskeletal:      Cervical back: Normal range of motion and neck supple  Lymphadenopathy:      Cervical: No cervical adenopathy

## 2024-01-10 ENCOUNTER — ANNUAL EXAM (OUTPATIENT)
Dept: GYNECOLOGY | Facility: CLINIC | Age: 44
End: 2024-01-10
Payer: COMMERCIAL

## 2024-01-10 VITALS
SYSTOLIC BLOOD PRESSURE: 125 MMHG | BODY MASS INDEX: 22.46 KG/M2 | WEIGHT: 114.4 LBS | HEIGHT: 60 IN | DIASTOLIC BLOOD PRESSURE: 84 MMHG

## 2024-01-10 DIAGNOSIS — Z12.39 ENCOUNTER FOR SCREENING BREAST EXAMINATION: ICD-10-CM

## 2024-01-10 DIAGNOSIS — Z01.419 ENCOUNTER FOR WELL WOMAN EXAM: Primary | ICD-10-CM

## 2024-01-10 PROCEDURE — 99396 PREV VISIT EST AGE 40-64: CPT | Performed by: PHYSICIAN ASSISTANT

## 2024-01-10 NOTE — PROGRESS NOTES
Assessment/Plan:    No problem-specific Assessment & Plan notes found for this encounter.       Diagnoses and all orders for this visit:    Encounter for well woman exam    Encounter for screening breast examination          Subjective:      Patient ID: Elke Nation is a 43 y.o. female.    Pt presents for her annual exam today--  She has no gyn complaints  She has regular bleeding  no pelvic pain  Bowel and bladder are regular  No breast concerns today  Last mammo--2/23    No pap today.    Rx mammo  Daily mvi        The following portions of the patient's history were reviewed and updated as appropriate: allergies, current medications, past family history, past medical history, past social history, past surgical history, and problem list.    Review of Systems   Constitutional:  Negative for chills, fever and unexpected weight change.   HENT:  Negative for ear pain and sore throat.    Eyes:  Negative for pain and visual disturbance.   Respiratory:  Negative for cough and shortness of breath.    Cardiovascular:  Negative for chest pain and palpitations.   Gastrointestinal:  Negative for abdominal pain, blood in stool, constipation, diarrhea and vomiting.   Genitourinary: Negative.  Negative for dysuria and hematuria.   Musculoskeletal:  Negative for arthralgias and back pain.   Skin:  Negative for color change and rash.   Neurological:  Negative for seizures and syncope.   All other systems reviewed and are negative.        Objective:      /84   Ht 5' (1.524 m)   Wt 51.9 kg (114 lb 6.4 oz)   LMP 12/29/2023 (Exact Date)   BMI 22.34 kg/m²          Physical Exam  Vitals and nursing note reviewed.   Constitutional:       Appearance: Normal appearance. She is well-developed and normal weight.   HENT:      Head: Normocephalic and atraumatic.   Chest:   Breasts:     Right: No inverted nipple, mass, nipple discharge or skin change.      Left: No inverted nipple, mass, nipple discharge or skin change.   Abdominal:       Palpations: Abdomen is soft.   Genitourinary:     Exam position: Supine.      Labia:         Right: No rash, tenderness or lesion.         Left: No rash, tenderness or lesion.       Vagina: Normal.      Cervix: No cervical motion tenderness, discharge or friability.      Adnexa:         Right: No mass, tenderness or fullness.          Left: No mass, tenderness or fullness.     Musculoskeletal:      Cervical back: Normal range of motion.   Lymphadenopathy:      Lower Body: No right inguinal adenopathy. No left inguinal adenopathy.   Neurological:      Mental Status: She is alert.

## 2024-02-15 ENCOUNTER — TELEPHONE (OUTPATIENT)
Dept: MAMMOGRAPHY | Facility: CLINIC | Age: 44
End: 2024-02-15

## 2024-03-07 DIAGNOSIS — Z00.6 ENCOUNTER FOR EXAMINATION FOR NORMAL COMPARISON OR CONTROL IN CLINICAL RESEARCH PROGRAM: ICD-10-CM

## 2024-03-11 ENCOUNTER — HOSPITAL ENCOUNTER (OUTPATIENT)
Dept: ULTRASOUND IMAGING | Facility: CLINIC | Age: 44
Discharge: HOME | End: 2024-03-11
Payer: COMMERCIAL

## 2024-03-11 ENCOUNTER — HOSPITAL ENCOUNTER (OUTPATIENT)
Dept: MAMMOGRAPHY | Facility: CLINIC | Age: 44
Discharge: HOME | End: 2024-03-11
Payer: COMMERCIAL

## 2024-03-11 VITALS — BODY MASS INDEX: 21.52 KG/M2 | WEIGHT: 114 LBS | HEIGHT: 61 IN

## 2024-03-11 DIAGNOSIS — R92.8 ABNORMAL MAMMOGRAM: ICD-10-CM

## 2024-03-11 PROCEDURE — G0279 TOMOSYNTHESIS, MAMMO: HCPCS

## 2024-03-11 PROCEDURE — 77066 DX MAMMO INCL CAD BI: CPT

## 2024-03-11 PROCEDURE — 76642 ULTRASOUND BREAST LIMITED: CPT

## 2024-04-01 ENCOUNTER — EVALUATION (OUTPATIENT)
Dept: PHYSICAL THERAPY | Facility: CLINIC | Age: 44
End: 2024-04-01
Payer: COMMERCIAL

## 2024-04-01 DIAGNOSIS — M72.2 PLANTAR FASCIITIS, RIGHT: Primary | ICD-10-CM

## 2024-04-01 PROCEDURE — 97112 NEUROMUSCULAR REEDUCATION: CPT | Performed by: PHYSICAL THERAPIST

## 2024-04-01 PROCEDURE — 97161 PT EVAL LOW COMPLEX 20 MIN: CPT | Performed by: PHYSICAL THERAPIST

## 2024-04-01 NOTE — LETTER
2024    Irma Sinclair MD   [facility]  [address]  [facility] [address]    Patient: Elke Nation   YOB: 1980   Date of Visit: 2024     Encounter Diagnosis     ICD-10-CM    1. Plantar fasciitis, right  M72.2           Dear Dr. Sinclair:    Thank you for your recent referral of Elke Nation. Please review the attached evaluation summary from Elke's recent visit.     Please verify that you agree with the plan of care by signing the attached order.     If you have any questions or concerns, please do not hesitate to call.     I sincerely appreciate the opportunity to share in the care of one of your patients and hope to have another opportunity to work with you in the near future.       Sincerely,    Julio Pride, PT      Referring Provider:      I certify that I have read the below Plan of Care and certify the need for these services furnished under this plan of treatment while under my care.                    Irma Sinclair MD   [facility]  [address]  [facility] [address]  Via Fax: [phone]          PT Evaluation     Today's date: 2024  Patient name: Elke Naiton  : 1980  MRN: [id number]  Referring provider: Irma Sinclair*  Dx:   Encounter Diagnosis     ICD-10-CM    1. Plantar fasciitis, right  M72.2                      Assessment  Assessment details: Pt presents with s/s consistent with R plantar fascitis.  Pt will benefit from PT to address impairments and restore function.  Impairments: abnormal gait, abnormal or restricted ROM, activity intolerance, impaired physical strength, lacks appropriate home exercise program and pain with function    Goals  ST-4 weeks.  1.  Pt will decrease pain > 50%.  2.  Pt will run 5 mi without pain.     LT-8 weeks.  1.  Pt will decrease pain > 75%.  2.  Pt will run 50K without pain.    Plan  Planned modality interventions: low level laser therapy  Planned therapy interventions: manual  therapy, neuromuscular re-education, body mechanics training, strengthening, gait training and home exercise program  Frequency: 2x week  Duration in weeks: 8        Subjective Evaluation    History of Present Illness  Mechanism of injury: Pt is a 43 yof c/o an insidious onset of R plantar fascia pain that began in Oct 23.  Pt reports pain progressively got worse until Dec 23.  She then held running for 2 months with no change in pain.  Pt then began running with her dog 1-1.5 mi 3-4 x/ wk in January with slightly increased pain 30 min after running.  Patient Goals  Patient goals for therapy: decreased edema, decreased pain, independence with ADLs/IADLs, increased strength and return to sport/leisure activities  Patient goal: run a 50K  Pain  Current pain ratin  At best pain ratin  At worst pain ratin  Location: medial calcaneal tuberacle, post calcaneus  Quality: dull ache and burning  Relieving factors: rest  Exacerbated by: 30 min after running, after prolonged standing and walking.  Progression: no change          Objective     Tenderness     Right Ankle/Foot   Tenderness in the medial calcaneus and plantar fascia.     Passive Range of Motion     Right Ankle/Foot    Dorsiflexion (ke): 20 degrees   Plantar flexion: 70 degrees   Inversion: 40 degrees   Eversion: 25 degrees   Great toe extension: 60 degrees     Strength/Myotome Testing     Right Ankle/Foot   Dorsiflexion: 5  Plantar flexion: 5  Inversion: 5  Eversion: 5    Additional Strength Details  SL heelraises: 30 B.  Amb: mild R hip ER t/o stance with mild medial heel whip.  OTC orthotics:  prox medial longitudional arch hot spot.  Repeated DF: NE             Precautions: none.    Dx: R plantar fascitis.    Manuals             Laser R plantar fascia                                                    Neuro Re-Ed                                                                                                        Ther Ex             SL ecc heel  raises off step 5#/ 1x10, 1x10                                                                                                       Ther Activity                                       Gait Training             Heel-toe 3 min                         Modalities

## 2024-04-01 NOTE — PROGRESS NOTES
PT Evaluation     Today's date: 2024  Patient name: Elke Nation  : 1980  MRN: [id number]  Referring provider: Irma Sinclair*  Dx:   Encounter Diagnosis     ICD-10-CM    1. Plantar fasciitis, right  M72.2                      Assessment  Assessment details: Pt presents with s/s consistent with R plantar fascitis.  Pt will benefit from PT to address impairments and restore function.  Impairments: abnormal gait, abnormal or restricted ROM, activity intolerance, impaired physical strength, lacks appropriate home exercise program and pain with function    Goals  ST-4 weeks.  1.  Pt will decrease pain > 50%.  2.  Pt will run 5 mi without pain.     LT-8 weeks.  1.  Pt will decrease pain > 75%.  2.  Pt will run 50K without pain.    Plan  Planned modality interventions: low level laser therapy  Planned therapy interventions: manual therapy, neuromuscular re-education, body mechanics training, strengthening, gait training and home exercise program  Frequency: 2x week  Duration in weeks: 8        Subjective Evaluation    History of Present Illness  Mechanism of injury: Pt is a 43 yof c/o an insidious onset of R plantar fascia pain that began in Oct 23.  Pt reports pain progressively got worse until Dec 23.  She then held running for 2 months with no change in pain.  Pt then began running with her dog 1-1.5 mi 3-4 x/ wk in January with slightly increased pain 30 min after running.  Patient Goals  Patient goals for therapy: decreased edema, decreased pain, independence with ADLs/IADLs, increased strength and return to sport/leisure activities  Patient goal: run a 50K  Pain  Current pain ratin  At best pain ratin  At worst pain ratin  Location: medial calcaneal tuberacle, post calcaneus  Quality: dull ache and burning  Relieving factors: rest  Exacerbated by: 30 min after running, after prolonged standing and walking.  Progression: no change          Objective     Tenderness     Right  Ankle/Foot   Tenderness in the medial calcaneus and plantar fascia.     Passive Range of Motion     Right Ankle/Foot    Dorsiflexion (ke): 20 degrees   Plantar flexion: 70 degrees   Inversion: 40 degrees   Eversion: 25 degrees   Great toe extension: 60 degrees     Strength/Myotome Testing     Right Ankle/Foot   Dorsiflexion: 5  Plantar flexion: 5  Inversion: 5  Eversion: 5    Additional Strength Details  SL heelraises: 30 B.  Amb: mild R hip ER t/o stance with mild medial heel whip.  OTC orthotics:  prox medial longitudional arch hot spot.  Repeated DF: NE             Precautions: none.    Dx: R plantar fascitis.    Manuals 4/1            Laser R plantar fascia                                                    Neuro Re-Ed                                                                                                        Ther Ex             SL ecc heel raises off step 5#/ 1x10, 1x10                                                                                                       Ther Activity                                       Gait Training             Heel-toe 3 min                         Modalities

## 2024-04-09 ENCOUNTER — OFFICE VISIT (OUTPATIENT)
Dept: PHYSICAL THERAPY | Facility: CLINIC | Age: 44
End: 2024-04-09
Payer: COMMERCIAL

## 2024-04-09 DIAGNOSIS — M72.2 PLANTAR FASCIITIS, RIGHT: Primary | ICD-10-CM

## 2024-04-09 PROCEDURE — 97112 NEUROMUSCULAR REEDUCATION: CPT | Performed by: PHYSICAL THERAPIST

## 2024-04-09 NOTE — PROGRESS NOTES
Daily Note     Today's date: 2024  Patient name: Elke Nation  : 1980  MRN: [id number]  Referring provider: Irma Sinclair*  Dx:   Encounter Diagnosis     ICD-10-CM    1. Plantar fasciitis, right  M72.2                      Subjective: Pt reports no change in pain, good compliance with HEP.    Objective: See treatment diary below    Assessment: Pt demonstrated mild R hip ER t/o stance, mild over-stride.    Plan: Continue per plan of care.      Precautions: none.    Dx: R plantar fascitis.    Manuals            Laser R plantar fascia  7000J                                                  Neuro Re-Ed                                                                                                        Ther Ex             SL ecc heel raises off step 5#/ 1x10, 1x10 5#/ 3x10                                                                                                      Ther Activity                                       Gait Training             Running assessment  10 min TM           Alter G: correct R hip ER, over-stride  15 min 80% BW 5.5 mph           Modalities

## 2024-04-16 ENCOUNTER — OFFICE VISIT (OUTPATIENT)
Dept: PHYSICAL THERAPY | Facility: CLINIC | Age: 44
End: 2024-04-16
Payer: COMMERCIAL

## 2024-04-16 DIAGNOSIS — M72.2 PLANTAR FASCIITIS, RIGHT: Primary | ICD-10-CM

## 2024-04-16 PROCEDURE — 97112 NEUROMUSCULAR REEDUCATION: CPT | Performed by: PHYSICAL THERAPIST

## 2024-04-16 PROCEDURE — 97116 GAIT TRAINING THERAPY: CPT | Performed by: PHYSICAL THERAPIST

## 2024-04-16 NOTE — PROGRESS NOTES
"Daily Note     Today's date: 2024  Patient name: Elke Nation  : 1980  MRN: [id number]  Referring provider: Irma Sinclair*  Dx:   Encounter Diagnosis     ICD-10-CM    1. Plantar fasciitis, right  M72.2                      Subjective: Pt reports good compliance with HEP, was able to more consistently perform 3 sets of 10 with 5#.    Objective: See treatment diary below    Assessment: Pt demonstrated improved push-off, R > L instability during SLS.    Plan: Continue per plan of care. Consider orthotics to assist with irritability.     Precautions: none.    Dx: R plantar fascitis.    Manuals           Laser R plantar fascia  7000J 7000J                                                 Neuro Re-Ed                          SLS   2x30\" ea          SL mini-squats   3x5 ea                                                              Ther Ex             SL ecc heel raises off step 5#/ 1x10, 1x10 5#/ 3x10 6#/ 3x10          prostretch   15\"x3                                                                                        Ther Activity                                       Gait Training             Running assessment  10 min TM           Alter G: correct R hip ER, over-stride  15 min 80% BW 5.5 mph 15 min 80% 6 mph          Modalities                                            "

## 2024-04-17 ENCOUNTER — APPOINTMENT (OUTPATIENT)
Dept: LAB | Facility: CLINIC | Age: 44
End: 2024-04-17

## 2024-04-17 DIAGNOSIS — Z00.6 ENCOUNTER FOR EXAMINATION FOR NORMAL COMPARISON OR CONTROL IN CLINICAL RESEARCH PROGRAM: ICD-10-CM

## 2024-04-17 PROCEDURE — 36415 COLL VENOUS BLD VENIPUNCTURE: CPT

## 2024-04-23 ENCOUNTER — OFFICE VISIT (OUTPATIENT)
Dept: PHYSICAL THERAPY | Facility: CLINIC | Age: 44
End: 2024-04-23
Payer: COMMERCIAL

## 2024-04-23 DIAGNOSIS — M72.2 PLANTAR FASCIITIS, RIGHT: Primary | ICD-10-CM

## 2024-04-23 PROCEDURE — 97116 GAIT TRAINING THERAPY: CPT | Performed by: PHYSICAL THERAPIST

## 2024-04-23 PROCEDURE — 97112 NEUROMUSCULAR REEDUCATION: CPT | Performed by: PHYSICAL THERAPIST

## 2024-04-24 NOTE — PROGRESS NOTES
"Daily Note     Today's date: 2024  Patient name: Elke Nation  : 1980  MRN: [id number]  Referring provider: Irma Sinclair*  Dx:   Encounter Diagnosis     ICD-10-CM    1. Plantar fasciitis, right  M72.2                      Subjective: Pt reports 3/10 PF pain, pt reports good compliance with HEP , but reports not being able to consistently perform her heel raises with 5# for 3x12 at home due to fatigue.    Objective: See treatment diary below    Assessment: Pt demonstrated improved stability with SL squats, mild L hip drop and IR during R SL squats.  Added standing towel scrunches and Graston.      Plan: Continue per plan of care. Consider orthotics to assist with irritability.     Precautions: none.    Dx: R plantar fascitis.    Manuals          Laser R plantar fascia  7000J [id number]         Graston R plantar fascia    6 min                                   Neuro Re-Ed                          SLS   2x30\" ea          SL mini-squats   3x5 ea 3x5 ea         Standing DL towel scrunches    3#/ 1x20         Standing SL towel scrunches    1x50                                   Ther Ex             SL ecc heel raises off step 5#/ 1x10, 1x10 5#/ 3x10 6#/ 3x10 7#/ 3x8         prostretch   15\"x3 15\"x3                                                                                       Ther Activity                                       Gait Training             Running assessment  10 min TM           Alter G: correct R hip ER, over-stride  15 min 80% BW 5.5 mph 15 min 80% 6 mph 10 min 90% 6 mph         Modalities                                            "

## 2024-04-30 ENCOUNTER — OFFICE VISIT (OUTPATIENT)
Dept: PHYSICAL THERAPY | Facility: CLINIC | Age: 44
End: 2024-04-30
Payer: COMMERCIAL

## 2024-04-30 DIAGNOSIS — M72.2 PLANTAR FASCIITIS, RIGHT: Primary | ICD-10-CM

## 2024-04-30 PROCEDURE — 97110 THERAPEUTIC EXERCISES: CPT | Performed by: PHYSICAL THERAPIST

## 2024-04-30 PROCEDURE — 97112 NEUROMUSCULAR REEDUCATION: CPT | Performed by: PHYSICAL THERAPIST

## 2024-04-30 NOTE — PROGRESS NOTES
"Daily Note     Today's date: 2024  Patient name: Elke Nation  : 1980  MRN: [id number]  Referring provider: Irma Sinclair*  Dx:   Encounter Diagnosis     ICD-10-CM    1. Plantar fasciitis, right  M72.2                      Subjective: Pt reports increased pain after running 3.5 mi and walking dogs for several hours on Saturday.  Pt reports good compliance with ecc heelraises 2xD with 5# for 3x12.    Objective: See treatment diary below    Assessment: Pt demonstrated improved DL intrinsic foot activation with towel scrunches, no change in irritability.  Pt is appropriate for orthotics.    Plan: Continue per plan of care. Perform orthotic assessment nv.     Precautions: none.    Dx: R plantar fascitis.    Manuals         Laser R plantar fascia  7000J [id number]        Graston R plantar fascia    6 min 7 min                                  Neuro Re-Ed                          SLS   2x30\" ea          SL mini-squats   3x5 ea 3x5 ea 3x6 ea        Standing DL towel scrunches    3#/ 1x20 2#/ 3x30        Standing SL towel scrunches    1x50                                   Ther Ex             SL ecc heel raises off step 5#/ 1x10, 1x10 5#/ 3x10 6#/ 3x10 7#/ 3x8 7#/ 3x8        prostretch   15\"x3 15\"x3 15\"x3                                                                                      Ther Activity                                       Gait Training             Running assessment  10 min TM           Alter G: correct R hip ER, over-stride  15 min 80% BW 5.5 mph 15 min 80% 6 mph 10 min 90% 6 mph         Modalities                                            "

## 2024-05-01 ENCOUNTER — OFFICE VISIT (OUTPATIENT)
Dept: FAMILY MEDICINE CLINIC | Facility: CLINIC | Age: 44
End: 2024-05-01
Payer: COMMERCIAL

## 2024-05-01 VITALS
OXYGEN SATURATION: 99 % | BODY MASS INDEX: 21.34 KG/M2 | WEIGHT: 113 LBS | RESPIRATION RATE: 16 BRPM | SYSTOLIC BLOOD PRESSURE: 120 MMHG | TEMPERATURE: 98 F | HEART RATE: 65 BPM | HEIGHT: 61 IN | DIASTOLIC BLOOD PRESSURE: 72 MMHG

## 2024-05-01 DIAGNOSIS — Z11.59 NEED FOR HEPATITIS C SCREENING TEST: ICD-10-CM

## 2024-05-01 DIAGNOSIS — M06.4 INFLAMMATORY POLYARTHROPATHY (HCC): Primary | ICD-10-CM

## 2024-05-01 DIAGNOSIS — Z00.00 ANNUAL PHYSICAL EXAM: ICD-10-CM

## 2024-05-01 PROCEDURE — 3725F SCREEN DEPRESSION PERFORMED: CPT | Performed by: FAMILY MEDICINE

## 2024-05-01 PROCEDURE — 99386 PREV VISIT NEW AGE 40-64: CPT | Performed by: FAMILY MEDICINE

## 2024-05-01 NOTE — PROGRESS NOTES
Name: Elke Nation      : 1980      MRN: [id number]  Encounter Provider: Laury Larson MD  Encounter Date: 2024   Encounter department: [facility]    Assessment & Plan     1. Inflammatory polyarthropathy (HCC)  Assessment & Plan:  Sees rheumatology OAA on medication which helps tita       2. Annual physical exam  Assessment & Plan:  Check routine labs      Orders:  -     Comprehensive metabolic panel; Future; Expected date: 2024  -     CBC and differential; Future  -     Lipid panel; Future; Expected date: 2024  -     TSH, 3rd generation; Future  -     Urinalysis with microscopic; Future    3. Need for hepatitis C screening test  -     Hepatitis C Antibody; Future           Subjective      New pt here to establish care due for physical biometric labs.form      Review of Systems   Constitutional:  Negative for appetite change, chills, fatigue and fever.   Respiratory:  Negative for cough, chest tightness and shortness of breath.    Cardiovascular:  Negative for chest pain, palpitations and leg swelling.   Gastrointestinal:  Negative for abdominal pain, constipation, diarrhea, nausea and vomiting.   Genitourinary:  Negative for difficulty urinating and frequency.   Musculoskeletal:  Negative for arthralgias, back pain, gait problem and neck pain.   Skin:  Negative for rash.   Neurological:  Negative for dizziness, weakness, light-headedness, numbness and headaches.   Hematological:  Does not bruise/bleed easily.   Psychiatric/Behavioral:  Negative for dysphoric mood and sleep disturbance. The patient is not nervous/anxious.        Current Outpatient Medications on File Prior to Visit   Medication Sig   • folic acid (FOLVITE) 1 mg tablet Take 1,000 mcg by mouth daily   • hydroquinone 4 % cream APPLY TO DARK ON CHEEKS NIGHTLY FOR 2 MONTHS AND THEN STOP FOR 1 MONTH AND THEN REPEAT   • methotrexate 2.5 MG tablet TAKE 6 TABLETS EVERY WEEK BY ORAL ROUTE   • multivitamin  "(THERAGRAN) TABS Take 1 tablet by mouth daily       Objective     /72 (BP Location: Left arm, Patient Position: Sitting, Cuff Size: Standard)   Pulse 65   Temp 98 °F (36.7 °C) (Tympanic)   Resp 16   Ht 5' 1\" (1.549 m)   Wt 51.3 kg (113 lb)   SpO2 99%   BMI 21.35 kg/m²     Physical Exam  Vitals reviewed.   Constitutional:       General: She is not in acute distress.     Appearance: Normal appearance. She is well-developed. She is not ill-appearing.   HENT:      Head: Normocephalic.      Right Ear: Tympanic membrane, ear canal and external ear normal.      Left Ear: Tympanic membrane, ear canal and external ear normal.      Nose: Nose normal.      Mouth/Throat:      Mouth: Mucous membranes are moist.      Pharynx: No oropharyngeal exudate.   Eyes:      General: Lids are normal. No scleral icterus.     Extraocular Movements: Extraocular movements intact.      Conjunctiva/sclera: Conjunctivae normal.      Pupils: Pupils are equal, round, and reactive to light.   Neck:      Thyroid: No thyromegaly.      Vascular: No carotid bruit.   Cardiovascular:      Rate and Rhythm: Normal rate and regular rhythm.      Pulses: Normal pulses.      Heart sounds: Normal heart sounds. No murmur heard.     No friction rub.   Pulmonary:      Effort: Pulmonary effort is normal.      Breath sounds: Normal breath sounds. No wheezing, rhonchi or rales.   Abdominal:      General: Bowel sounds are normal. There is no distension.      Palpations: Abdomen is soft. There is no mass.      Tenderness: There is no abdominal tenderness. There is no guarding.      Hernia: No hernia is present.   Musculoskeletal:         General: Normal range of motion.      Cervical back: Normal range of motion and neck supple.   Lymphadenopathy:      Cervical: No cervical adenopathy.   Skin:     General: Skin is warm and dry.      Findings: No rash.      Comments: No abnormal appearing moles   Neurological:      General: No focal deficit present.      " Mental Status: She is alert and oriented to person, place, and time. Mental status is at baseline.      Cranial Nerves: No cranial nerve deficit.      Sensory: No sensory deficit.      Motor: No weakness, tremor or abnormal muscle tone.      Coordination: Coordination normal.      Gait: Gait normal.      Deep Tendon Reflexes: Reflexes normal.   Psychiatric:         Mood and Affect: Mood normal.         Speech: Speech normal.         Behavior: Behavior normal.       Laury Larson MD

## 2024-05-05 LAB
APOB+LDLR+PCSK9 GENE MUT ANL BLD/T: NOT DETECTED
BRCA1+BRCA2 DEL+DUP + FULL MUT ANL BLD/T: NOT DETECTED
MLH1+MSH2+MSH6+PMS2 GN DEL+DUP+FUL M: NOT DETECTED

## 2024-05-06 LAB
ALBUMIN SERPL-MCNC: 4.2 G/DL (ref 3.6–5.1)
ALBUMIN/GLOB SERPL: 1.9 (CALC) (ref 1–2.5)
ALP SERPL-CCNC: 57 U/L (ref 31–125)
ALT SERPL-CCNC: 22 U/L (ref 6–29)
APPEARANCE UR: CLEAR
AST SERPL-CCNC: 28 U/L (ref 10–30)
BACTERIA UR QL AUTO: NORMAL /HPF
BASOPHILS # BLD AUTO: 20 CELLS/UL (ref 0–200)
BASOPHILS NFR BLD AUTO: 0.4 %
BILIRUB SERPL-MCNC: 1.2 MG/DL (ref 0.2–1.2)
BILIRUB UR QL STRIP: NEGATIVE
BUN SERPL-MCNC: 7 MG/DL (ref 7–25)
BUN/CREAT SERPL: NORMAL (CALC) (ref 6–22)
CALCIUM SERPL-MCNC: 9 MG/DL (ref 8.6–10.2)
CHLORIDE SERPL-SCNC: 101 MMOL/L (ref 98–110)
CHOLEST SERPL-MCNC: 169 MG/DL
CHOLEST/HDLC SERPL: 2 (CALC)
CO2 SERPL-SCNC: 26 MMOL/L (ref 20–32)
COLOR UR: YELLOW
CREAT SERPL-MCNC: 0.66 MG/DL (ref 0.5–0.99)
EOSINOPHIL # BLD AUTO: 78 CELLS/UL (ref 15–500)
EOSINOPHIL NFR BLD AUTO: 1.6 %
ERYTHROCYTE [DISTWIDTH] IN BLOOD BY AUTOMATED COUNT: 12.1 % (ref 11–15)
GFR/BSA.PRED SERPLBLD CYS-BASED-ARV: 112 ML/MIN/1.73M2
GLOBULIN SER CALC-MCNC: 2.2 G/DL (CALC) (ref 1.9–3.7)
GLUCOSE SERPL-MCNC: 85 MG/DL (ref 65–99)
GLUCOSE UR QL STRIP: NEGATIVE
HCT VFR BLD AUTO: 43.8 % (ref 35–45)
HCV AB SERPL QL IA: NORMAL
HDLC SERPL-MCNC: 83 MG/DL
HGB BLD-MCNC: 14.8 G/DL (ref 11.7–15.5)
HGB UR QL STRIP: NEGATIVE
HYALINE CASTS #/AREA URNS LPF: NORMAL /LPF
KETONES UR QL STRIP: NEGATIVE
LDLC SERPL CALC-MCNC: 73 MG/DL (CALC)
LEUKOCYTE ESTERASE UR QL STRIP: NEGATIVE
LYMPHOCYTES # BLD AUTO: 1387 CELLS/UL (ref 850–3900)
LYMPHOCYTES NFR BLD AUTO: 28.3 %
MCH RBC QN AUTO: 31.4 PG (ref 27–33)
MCHC RBC AUTO-ENTMCNC: 33.8 G/DL (ref 32–36)
MCV RBC AUTO: 92.8 FL (ref 80–100)
MONOCYTES # BLD AUTO: 505 CELLS/UL (ref 200–950)
MONOCYTES NFR BLD AUTO: 10.3 %
NEUTROPHILS # BLD AUTO: 2911 CELLS/UL (ref 1500–7800)
NEUTROPHILS NFR BLD AUTO: 59.4 %
NITRITE UR QL STRIP: NEGATIVE
NONHDLC SERPL-MCNC: 86 MG/DL (CALC)
PH UR STRIP: 6.5 [PH] (ref 5–8)
PLATELET # BLD AUTO: 210 THOUSAND/UL (ref 140–400)
PMV BLD REES-ECKER: 11.3 FL (ref 7.5–12.5)
POTASSIUM SERPL-SCNC: 3.5 MMOL/L (ref 3.5–5.3)
PROT SERPL-MCNC: 6.4 G/DL (ref 6.1–8.1)
PROT UR QL STRIP: NEGATIVE
RBC # BLD AUTO: 4.72 MILLION/UL (ref 3.8–5.1)
RBC #/AREA URNS HPF: NORMAL /HPF
SODIUM SERPL-SCNC: 137 MMOL/L (ref 135–146)
SP GR UR STRIP: 1 (ref 1–1.03)
SQUAMOUS #/AREA URNS HPF: NORMAL /HPF
TRIGL SERPL-MCNC: 57 MG/DL
TSH SERPL-ACNC: 1.14 MIU/L
WBC # BLD AUTO: 4.9 THOUSAND/UL (ref 3.8–10.8)
WBC #/AREA URNS HPF: NORMAL /HPF

## 2024-05-07 ENCOUNTER — OFFICE VISIT (OUTPATIENT)
Dept: PHYSICAL THERAPY | Facility: CLINIC | Age: 44
End: 2024-05-07
Payer: COMMERCIAL

## 2024-05-07 DIAGNOSIS — M72.2 PLANTAR FASCIITIS, RIGHT: Primary | ICD-10-CM

## 2024-05-07 PROCEDURE — 97110 THERAPEUTIC EXERCISES: CPT | Performed by: PHYSICAL THERAPIST

## 2024-05-07 PROCEDURE — 97112 NEUROMUSCULAR REEDUCATION: CPT | Performed by: PHYSICAL THERAPIST

## 2024-05-07 NOTE — PROGRESS NOTES
"Daily Note     Today's date: 2024  Patient name: Elke Nation  : 1980  MRN: [id number]  Referring provider: Irma Sinclair*  Dx:   Encounter Diagnosis     ICD-10-CM    1. Plantar fasciitis, right  M72.2                      Subjective: Pt reports increased pain while running, after prolonged sitting and inability to perform resisted heel raises off the step secondary to pain.  Pt reports wearing flats more frequently at work, but no other changes in ADLs.    Objective: See treatment diary below    Assessment: Pt demonstrated increased irritability.  Instructed pt to hold heelraises for 3 days, until irritation resolves and she can perform pain-free body weight heel raises.  Pt would benefit from and was fitted for orthotics for work flats.  Foot posture index: 2.    Plan: Continue per plan of care.      Precautions: none.    Dx: R plantar fascitis.    Manuals        Laser R plantar fascia  7000J 7000J 19538P 8000J 8000J       Graston R plantar fascia    6 min 7 min 7 min                                 Neuro Re-Ed                          SLS   2x30\" ea   Foam/ 2x60\" ea       SLS rebounder pball      1x30 ea       SL mini-squats   3x5 ea 3x5 ea 3x6 ea        Standing DL towel scrunches    3#/ 1x20 2#/ 3x30        Standing SL towel scrunches    1x50                                   Ther Ex             SL ecc heel raises off step 5#/ 1x10, 1x10 5#/ 3x10 6#/ 3x10 7#/ 3x8 7#/ 3x8 Unable to perform secondary to pain       DL/SL con/ecc heelraises on floor      3x12       prostretch   15\"x3 15\"x3 15\"x3 15\"x3 ea                                                                                     Ther Activity                                       Gait Training             Running assessment  10 min TM           Alter G: correct R hip ER, over-stride  15 min 80% BW 5.5 mph 15 min 80% 6 mph 10 min 90% 6 mph         Modalities                                            "

## 2024-05-14 ENCOUNTER — APPOINTMENT (OUTPATIENT)
Dept: PHYSICAL THERAPY | Facility: CLINIC | Age: 44
End: 2024-05-14
Payer: COMMERCIAL

## 2024-05-21 ENCOUNTER — OFFICE VISIT (OUTPATIENT)
Dept: PHYSICAL THERAPY | Facility: CLINIC | Age: 44
End: 2024-05-21
Payer: COMMERCIAL

## 2024-05-21 DIAGNOSIS — M72.2 PLANTAR FASCIITIS, RIGHT: Primary | ICD-10-CM

## 2024-05-21 PROCEDURE — 97110 THERAPEUTIC EXERCISES: CPT | Performed by: PHYSICAL THERAPIST

## 2024-05-21 PROCEDURE — 97112 NEUROMUSCULAR REEDUCATION: CPT | Performed by: PHYSICAL THERAPIST

## 2024-05-21 NOTE — PROGRESS NOTES
"Daily Note     Today's date: 2024  Patient name: Elke Nation  : 1980  MRN: [id number]  Referring provider: Irma Sinclair*  Dx:   Encounter Diagnosis     ICD-10-CM    1. Plantar fasciitis, right  M72.2                      Subjective: Pt reports improved pain for the past 2.5 weeks, but DL/SL con/ecc heel raises are difficult and sometimes produce 3-4/10 pain.    Objective: See treatment diary below    Assessment: Pt demonstrated decreased irritability, but increased weakness remains.    Plan: Continue per plan of care.      Precautions: none.    Dx: R plantar fascitis.    Manuals       Laser R plantar fascia  7000J 7000J 58902V 8000J 8000J 9000J      Graston R plantar fascia    6 min 7 min 7 min 7 min                                Neuro Re-Ed                          SLS   2x30\" ea   Foam/ 2x60\" ea Foam 2x60\"      SLS rebounder pball      1x30 ea Foam/ 3x10      SL mini-squats   3x5 ea 3x5 ea 3x6 ea        Standing DL towel scrunches    3#/ 1x20 2#/ 3x30        Standing SL towel scrunches    1x50                                   Ther Ex             SL ecc heel raises off step 5#/ 1x10, 1x10 5#/ 3x10 6#/ 3x10 7#/ 3x8 7#/ 3x8 Unable to perform secondary to pain       DL/SL con/ecc heelraises on floor      3x12       DL/SL con/ecc heelraises off step       2x10      prostretch   15\"x3 15\"x3 15\"x3 15\"x3 ea 15\"x3      SL ecc heelraises on Leg Press Machine       40#/ 3x10 60#/ 3x10                                                                      Ther Activity                                       Gait Training             Running assessment  10 min TM           Alter G: correct R hip ER, over-stride  15 min 80% BW 5.5 mph 15 min 80% 6 mph 10 min 90% 6 mph         Modalities                                            "

## 2024-05-28 ENCOUNTER — OFFICE VISIT (OUTPATIENT)
Dept: PHYSICAL THERAPY | Facility: CLINIC | Age: 44
End: 2024-05-28
Payer: COMMERCIAL

## 2024-05-28 DIAGNOSIS — M72.2 PLANTAR FASCIITIS, RIGHT: Primary | ICD-10-CM

## 2024-05-28 PROCEDURE — 97112 NEUROMUSCULAR REEDUCATION: CPT | Performed by: PHYSICAL THERAPIST

## 2024-05-28 NOTE — PROGRESS NOTES
"Daily Note     Today's date: 2024  Patient name: Elke Nation  : 1980  MRN: [id number]  Referring provider: Irma Sinclair*  Dx:   Encounter Diagnosis     ICD-10-CM    1. Plantar fasciitis, right  M72.2                      Subjective: Pt reports slowly improving pain and tolerance to heel raises.    Objective: See treatment diary below    Assessment: Orthotics arrived and were too long and wide to fit in her work shoes.  They will be sent back to [facility] for modifications.  Pt demonstrated improving stability.    Plan: Continue per plan of care.      Precautions: none.    Dx: R plantar fascitis.    Manuals      Laser R plantar fascia  7000J 7000J 20333Q 8000J 8000J 9000J 9000J     Graston R plantar fascia    6 min 7 min 7 min 7 min 7 min                               Neuro Re-Ed                          SLS   2x30\" ea   Foam/ 2x60\" ea Foam 2x60\" Foam/ 1x90\"     SLS rebounder pball      1x30 ea Foam/ 3x10 Foam/ 1x40     SL mini-squats   3x5 ea 3x5 ea 3x6 ea        Standing DL towel scrunches    3#/ 1x20 2#/ 3x30        Standing SL towel scrunches    1x50         Standing hip ABD         Foam/ 3x10                  Ther Ex             SL ecc heel raises off step 5#/ 1x10, 1x10 5#/ 3x10 6#/ 3x10 7#/ 3x8 7#/ 3x8 Unable to perform secondary to pain       DL/SL con/ecc heelraises on floor      3x12       DL/SL con/ecc heelraises off step       2x10      prostretch   15\"x3 15\"x3 15\"x3 15\"x3 ea 15\"x3      SL ecc heelraises on Leg Press Machine       40#/ 3x10  60#/ 3x10                                                                     Ther Activity                                       Gait Training             Running assessment  10 min TM           Alter G: correct R hip ER, over-stride  15 min 80% BW 5.5 mph 15 min 80% 6 mph 10 min 90% 6 mph         Modalities                                            "

## 2024-06-03 ENCOUNTER — APPOINTMENT (OUTPATIENT)
Dept: PHYSICAL THERAPY | Facility: CLINIC | Age: 44
End: 2024-06-03
Payer: COMMERCIAL

## 2024-06-06 ENCOUNTER — APPOINTMENT (OUTPATIENT)
Dept: PHYSICAL THERAPY | Facility: CLINIC | Age: 44
End: 2024-06-06
Payer: COMMERCIAL

## 2024-06-10 ENCOUNTER — OFFICE VISIT (OUTPATIENT)
Dept: PHYSICAL THERAPY | Facility: CLINIC | Age: 44
End: 2024-06-10
Payer: COMMERCIAL

## 2024-06-10 DIAGNOSIS — M54.50 LUMBAR PAIN: ICD-10-CM

## 2024-06-10 DIAGNOSIS — M72.2 PLANTAR FASCIITIS, RIGHT: Primary | ICD-10-CM

## 2024-06-10 PROCEDURE — L3010 FOOT LONGITUDINAL ARCH SUPPO: HCPCS | Performed by: PHYSICAL THERAPIST

## 2024-06-10 PROCEDURE — 97112 NEUROMUSCULAR REEDUCATION: CPT | Performed by: PHYSICAL THERAPIST

## 2024-06-11 ENCOUNTER — APPOINTMENT (OUTPATIENT)
Dept: PHYSICAL THERAPY | Facility: CLINIC | Age: 44
End: 2024-06-11
Payer: COMMERCIAL

## 2024-06-17 ENCOUNTER — OFFICE VISIT (OUTPATIENT)
Dept: PHYSICAL THERAPY | Facility: CLINIC | Age: 44
End: 2024-06-17
Payer: COMMERCIAL

## 2024-06-17 DIAGNOSIS — M72.2 PLANTAR FASCIITIS, RIGHT: Primary | ICD-10-CM

## 2024-06-17 PROCEDURE — 97110 THERAPEUTIC EXERCISES: CPT | Performed by: PHYSICAL THERAPIST

## 2024-06-17 PROCEDURE — 97112 NEUROMUSCULAR REEDUCATION: CPT | Performed by: PHYSICAL THERAPIST

## 2024-06-18 NOTE — PROGRESS NOTES
"Daily Note     Today's date: 2024  Patient name: Elke Nation  : 1980  MRN: [id number]  Referring provider: Irma Sinclair*  Dx:   Encounter Diagnosis     ICD-10-CM    1. Plantar fasciitis, right  M72.2                      Subjective: Pt reports 3/10 pain at worst with walking for several hours and running 5 mi.  Pt reports min pain t/o most of the day.    Objective: See treatment diary below    Assessment: Pt demonstrated improved irritability.    Plan: Continue per plan of care. Increase orthotics wear to 4 hrs in work shoes, slowly progress ecc heel raises as able.     Precautions: none.    Dx: R plantar fascitis, lumbar derangement with ext DP.    Manuals     Laser R plantar fascia  7000J 7000J 54692P 8000J 8000J 9000J 9000J 5000J    Graston R plantar fascia    6 min 7 min 7 min 7 min 7 min 7 min                              Neuro Re-Ed             SLS hip ABD         Foam 3x10    SLS   2x30\" ea   Foam/ 2x60\" ea Foam 2x60\" Foam/ 1x90\"     SLS rebounder pball      1x30 ea Foam/ 3x10 Foam/ 1x40 Foam 3x10    SL mini-squats   3x5 ea 3x5 ea 3x6 ea        Standing DL towel scrunches    3#/ 1x20 2#/ 3x30        Standing SL towel scrunches    1x50         Standing hip ABD         Foam/ 3x10     EIL             Ther Ex             SL ecc heel raises off step 5#/ 1x10, 1x10 5#/ 3x10 6#/ 3x10 7#/ 3x8 7#/ 3x8 Unable to perform secondary to pain   2x10    DL/SL con/ecc heelraises on floor      3x12       DL/SL con/ecc heelraises off step       2x10      prostretch   15\"x3 15\"x3 15\"x3 15\"x3 ea 15\"x3      SL ecc heelraises on Leg Press Machine       40#/ 3x10                                                                       Ther Activity                                       Gait Training             Running assessment  10 min TM           Alter G: correct R hip ER, over-stride  15 min 80% BW 5.5 mph 15 min 80% 6 mph 10 min 90% 6 mph         Modalities        "

## 2024-06-25 ENCOUNTER — OFFICE VISIT (OUTPATIENT)
Dept: PHYSICAL THERAPY | Facility: CLINIC | Age: 44
End: 2024-06-25
Payer: COMMERCIAL

## 2024-06-25 DIAGNOSIS — M72.2 PLANTAR FASCIITIS, RIGHT: Primary | ICD-10-CM

## 2024-06-25 DIAGNOSIS — M54.50 LUMBAR PAIN: ICD-10-CM

## 2024-06-25 PROCEDURE — 97140 MANUAL THERAPY 1/> REGIONS: CPT | Performed by: PHYSICAL THERAPIST

## 2024-06-25 PROCEDURE — 97110 THERAPEUTIC EXERCISES: CPT | Performed by: PHYSICAL THERAPIST

## 2024-06-25 NOTE — PROGRESS NOTES
"Daily Note     Today's date: 2024  Patient name: Elke Nation  : 1980  MRN: [id number]  Referring provider: Irma Sinclair*  Dx:   Encounter Diagnosis     ICD-10-CM    1. Plantar fasciitis, right  M72.2       2. Lumbar pain  M54.50                      Subjective: Pt reports 1/10 pain at worst with walking for several hours and running 4+ mi.  Pt reports wearing her orthotics all day at work.    Objective: See treatment diary below    Assessment: Pt demonstrated improved irritability.    Plan: Continue per plan of care. Add resistance to heel raises if HEP 3x12 with less than 1/10 pain.     Precautions: none.    Dx: R plantar fascitis, lumbar derangement with ext DP.    Manuals     Laser R plantar fascia  7000J 7000J 52714T 8000J 8000J 9000J 9000J 5000J    Graston R plantar fascia    6 min 7 min 7 min 7 min 7 min 7 min                              Neuro Re-Ed             SLS hip ABD         Foam 3x10    SLS   2x30\" ea   Foam/ 2x60\" ea Foam 2x60\" Foam/ 1x90\"     SLS rebounder pball      1x30 ea Foam/ 3x10 Foam/ 1x40     SL mini-squats   3x5 ea 3x5 ea 3x6 ea        Standing DL towel scrunches    3#/ 1x20 2#/ 3x30        Standing SL towel scrunches    1x50         Standing hip ABD         Foam/ 3x10     EIL             Ther Ex             SL ecc heel raises off step 5#/ 1x10, 1x10 5#/ 3x10 6#/ 3x10 7#/ 3x8 7#/ 3x8 Unable to perform secondary to pain   3x12    DL/SL con/ecc heelraises on floor      3x12       DL/SL con/ecc heelraises off step       2x10      prostretch   15\"x3 15\"x3 15\"x3 15\"x3 ea 15\"x3      SL ecc heelraises on Leg Press Machine       40#/ 3x10                                                                       Ther Activity                                       Gait Training             Running assessment  10 min TM           Alter G: correct R hip ER, over-stride  15 min 80% BW 5.5 mph 15 min 80% 6 mph 10 min 90% 6 mph       "   Modalities

## 2024-07-09 ENCOUNTER — EVALUATION (OUTPATIENT)
Dept: PHYSICAL THERAPY | Facility: CLINIC | Age: 44
End: 2024-07-09
Payer: COMMERCIAL

## 2024-07-09 DIAGNOSIS — M72.2 PLANTAR FASCIITIS, RIGHT: Primary | ICD-10-CM

## 2024-07-09 PROCEDURE — 97110 THERAPEUTIC EXERCISES: CPT | Performed by: PHYSICAL THERAPIST

## 2024-07-09 PROCEDURE — 97140 MANUAL THERAPY 1/> REGIONS: CPT | Performed by: PHYSICAL THERAPIST

## 2024-07-09 NOTE — LETTER
2024    Irma Sinclair MD   [facility]  [address]  [facility] [address]    Patient: Elke Nation   YOB: 1980   Date of Visit: 2024     Encounter Diagnosis     ICD-10-CM    1. Plantar fasciitis, right  M72.2           Dear Dr. Sinclair:    Thank you for your recent referral of Elke Nation. Please review the attached evaluation summary from Elke's recent visit.     Please verify that you agree with the plan of care by signing the attached order.     If you have any questions or concerns, please do not hesitate to call.     I sincerely appreciate the opportunity to share in the care of one of your patients and hope to have another opportunity to work with you in the near future.       Sincerely,    Julio Pride, PT      Referring Provider:      I certify that I have read the below Plan of Care and certify the need for these services furnished under this plan of treatment while under my care.                    Irma Sinclair MD   [facility]  [address]  [facility] [address]  Via Fax: [phone]          Daily Note     Today's date: 2024  Patient name: Elke Nation  : 1980  MRN: [id number]  Referring provider: Irma Sinclair*  Dx:   Encounter Diagnosis     ICD-10-CM    1. Plantar fasciitis, right  M72.2                      Subjective: Pt reports 3/10 PF pain at worst with walking for several hours two days in a row and running 6 mi.  Pt reports 2-3 days of increased soreness after last visit.     Objective: See treatment diary below    Assessment: Pt demonstrated increased irritability and with increased running.  Assess response to a slower mileage progression and assess response to 5# increase in DB resistance to DL/SL heelraises.    Plan: Continue per plan of care. Add resistance to heel raises if HEP 3x12 with less than 1/10 pain.     Precautions: none.    Dx: R plantar fascitis, lumbar derangement with ext DP.    Manuals   "   Laser R plantar fascia  7000J 7000J 16650S 8000J 8000J 9000J 9000J 5000J 9000J   Graston R plantar fascia    6 min 7 min 7 min 7 min 7 min 7 min 10 min                             Neuro Re-Ed             SLS hip ABD         Foam 3x10    SLS   2x30\" ea   Foam/ 2x60\" ea Foam 2x60\" Foam/ 1x90\"     SLS rebounder pball      1x30 ea Foam/ 3x10 Foam/ 1x40     SL mini-squats   3x5 ea 3x5 ea 3x6 ea        Standing DL towel scrunches    3#/ 1x20 2#/ 3x30        Seated towel scrunches    1x50      3#/ 3x30   Standing hip ABD         Foam/ 3x10     EIL             Ther Ex             SL ecc heel raises off step 5#/ 1x10, 1x10 5#/ 3x10 6#/ 3x10 7#/ 3x8 7#/ 3x8 Unable to perform secondary to pain   3x12    DL/SL con/ecc heelraises on floor      3x12       DL/SL con/ecc heelraises off step       2x10   5#/ 2x10   prostretch   15\"x3 15\"x3 15\"x3 15\"x3 ea 15\"x3      SL ecc heelraises on Leg Press Machine       40#/ 3x10      Standing PF stretch          15\"x3                                                       Ther Activity                                       Gait Training             Running assessment  10 min TM           Alter G: correct R hip ER, over-stride  15 min 80% BW 5.5 mph 15 min 80% 6 mph 10 min 90% 6 mph         Modalities                                              PT Re-Evaluation     Today's date: 2024  Patient name: Elke Nation  : 1980  MRN: [id number]  Referring provider: Irma Sinclair*  Dx:   Encounter Diagnosis     ICD-10-CM    1. Plantar fasciitis, right  M72.2           Start Time: 1700  Stop Time: 1745  Total time in clinic (min): 45 minutes    Assessment  Impairments: abnormal gait, abnormal or restricted ROM, activity intolerance, impaired physical strength, lacks appropriate home exercise program and pain with function    Assessment details: Pt demonstrated improved pain, tolerance to running and walking.  Pt will benefit from PT to " address impairments and restore function.    Goals  ST-4 weeks.  1.  Pt will decrease pain > 50%. met  2.  Pt will run 5 mi without pain.     LT-8 weeks.  1.  Pt will decrease pain > 75%.  2.  Pt will run 50K without pain.    Plan  Planned modality interventions: low level laser therapy    Planned therapy interventions: manual therapy, neuromuscular re-education, body mechanics training, strengthening, gait training and home exercise program    Frequency: 1x week  Duration in weeks: 8      Subjective Evaluation    History of Present Illness  Mechanism of injury: Pt is a 43 yof c/o an insidious onset of R plantar fascia pain that began in Oct 23.  Pt reports pain progressively got worse until Dec 23.  She then held running for 2 months with no change in pain.  Pt then began running with her dog 1-1.5 mi 3-4 x/ wk in January with slightly increased pain 30 min after running.  Patient Goals  Patient goals for therapy: decreased edema, decreased pain, independence with ADLs/IADLs, increased strength and return to sport/leisure activities  Patient goal: run a 50K  Pain  Current pain ratin  At best pain ratin  At worst pain rating: 3  Location: medial calcaneal tuberacle, post calcaneus  Quality: dull ache  Relieving factors: rest  Exacerbated by: 30 min after running, after prolonged standing and walking.  Progression: improved        Objective     Tenderness     Right Ankle/Foot   Tenderness in the medial calcaneus and plantar fascia.     Passive Range of Motion     Right Ankle/Foot    Dorsiflexion (ke): 20 degrees   Plantar flexion: 70 degrees   Inversion: 40 degrees   Eversion: 25 degrees   Great toe extension: 60 degrees     Strength/Myotome Testing     Right Ankle/Foot   Dorsiflexion: 5  Plantar flexion: 5  Inversion: 5  Eversion: 5    Additional Strength Details  Amb: normalized.  Custom orthotics for work shoes.

## 2024-07-09 NOTE — PROGRESS NOTES
"Daily Note     Today's date: 2024  Patient name: Elke Nation  : 1980  MRN: [id number]  Referring provider: Irma Sinclair*  Dx:   Encounter Diagnosis     ICD-10-CM    1. Plantar fasciitis, right  M72.2                      Subjective: Pt reports 3/10 PF pain at worst with walking for several hours two days in a row and running 6 mi.  Pt reports 2-3 days of increased soreness after last visit.     Objective: See treatment diary below    Assessment: Pt demonstrated increased irritability and with increased running.  Assess response to a slower mileage progression and assess response to 5# increase in DB resistance to DL/SL heelraises.    Plan: Continue per plan of care. Add resistance to heel raises if HEP 3x12 with less than 1/10 pain.     Precautions: none.    Dx: R plantar fascitis, lumbar derangement with ext DP.    Manuals    Laser R plantar fascia  7000J 7000J 98475V 8000J 8000J 9000J 9000J 5000J 9000J   Graston R plantar fascia    6 min 7 min 7 min 7 min 7 min 7 min 10 min                             Neuro Re-Ed             SLS hip ABD         Foam 3x10    SLS   2x30\" ea   Foam/ 2x60\" ea Foam 2x60\" Foam/ 1x90\"     SLS rebounder pball      1x30 ea Foam/ 3x10 Foam/ 1x40     SL mini-squats   3x5 ea 3x5 ea 3x6 ea        Standing DL towel scrunches    3#/ 1x20 2#/ 3x30        Seated towel scrunches    1x50      3#/ 3x30   Standing hip ABD         Foam/ 3x10     EIL             Ther Ex             SL ecc heel raises off step 5#/ 1x10, 1x10 5#/ 3x10 6#/ 3x10 7#/ 3x8 7#/ 3x8 Unable to perform secondary to pain   3x12    DL/SL con/ecc heelraises on floor      3x12       DL/SL con/ecc heelraises off step       2x10   5#/ 2x10   prostretch   15\"x3 15\"x3 15\"x3 15\"x3 ea 15\"x3      SL ecc heelraises on Leg Press Machine       40#/ 3x10      Standing PF stretch          15\"x3                                                       Ther Activity                  "                      Gait Training             Running assessment  10 min TM           Alter G: correct R hip ER, over-stride  15 min 80% BW 5.5 mph 15 min 80% 6 mph 10 min 90% 6 mph         Modalities

## 2024-07-09 NOTE — LETTER
2024    Irma Sinclair MD   [facility]  [address]  [facility] [address]    Patient: Elke Nation   YOB: 1980   Date of Visit: 2024     Encounter Diagnosis     ICD-10-CM    1. Plantar fasciitis, right  M72.2           Dear Dr. Sinclair:    Thank you for your recent referral of Elke Nation. Please review the attached evaluation summary from Elke's recent visit.     Please verify that you agree with the plan of care by signing the attached order.     If you have any questions or concerns, please do not hesitate to call.     I sincerely appreciate the opportunity to share in the care of one of your patients and hope to have another opportunity to work with you in the near future.       Sincerely,    Julio Pride, PT      Referring Provider:      I certify that I have read the below Plan of Care and certify the need for these services furnished under this plan of treatment while under my care.                    Irma Sinclair MD   [facility]  [address]  [facility] [address]  Via Fax: [phone]          Daily Note     Today's date: 2024  Patient name: Elke Nation  : 1980  MRN: [id number]  Referring provider: Irma Sinclair*  Dx:   Encounter Diagnosis     ICD-10-CM    1. Plantar fasciitis, right  M72.2                      Subjective: Pt reports 3/10 PF pain at worst with walking for several hours two days in a row and running 6 mi.  Pt reports 2-3 days of increased soreness after last visit.     Objective: See treatment diary below    Assessment: Pt demonstrated increased irritability and with increased running.  Assess response to a slower mileage progression and assess response to 5# increase in DB resistance to DL/SL heelraises.    Plan: Continue per plan of care. Add resistance to heel raises if HEP 3x12 with less than 1/10 pain.     Precautions: none.    Dx: R plantar fascitis, lumbar derangement with ext DP.    Manuals   "   Laser R plantar fascia  7000J 7000J [id number] 8000J 8000J 9000J 9000J 5000J 9000J   Graston R plantar fascia    6 min 7 min 7 min 7 min 7 min 7 min 10 min                             Neuro Re-Ed             SLS hip ABD         Foam 3x10    SLS   2x30\" ea   Foam/ 2x60\" ea Foam 2x60\" Foam/ 1x90\"     SLS rebounder pball      1x30 ea Foam/ 3x10 Foam/ 1x40     SL mini-squats   3x5 ea 3x5 ea 3x6 ea        Standing DL towel scrunches    3#/ 1x20 2#/ 3x30        Seated towel scrunches    1x50      3#/ 3x30   Standing hip ABD         Foam/ 3x10     EIL             Ther Ex             SL ecc heel raises off step 5#/ 1x10, 1x10 5#/ 3x10 6#/ 3x10 7#/ 3x8 7#/ 3x8 Unable to perform secondary to pain   3x12    DL/SL con/ecc heelraises on floor      3x12       DL/SL con/ecc heelraises off step       2x10   5#/ 2x10   prostretch   15\"x3 15\"x3 15\"x3 15\"x3 ea 15\"x3      SL ecc heelraises on Leg Press Machine       40#/ 3x10      Standing PF stretch          15\"x3                                                       Ther Activity                                       Gait Training             Running assessment  10 min TM           Alter G: correct R hip ER, over-stride  15 min 80% BW 5.5 mph 15 min 80% 6 mph 10 min 90% 6 mph         Modalities                                              PT Re-Evaluation     Today's date: 2024  Patient name: Elke Nation  : 1980  MRN: [id number]  Referring provider: Irma Sinclair*  Dx:   Encounter Diagnosis     ICD-10-CM    1. Plantar fasciitis, right  M72.2           Start Time: 1700  Stop Time: 1745  Total time in clinic (min): 45 minutes    Assessment  Impairments: abnormal gait, abnormal or restricted ROM, activity intolerance, impaired physical strength, lacks appropriate home exercise program and pain with function    Assessment details: Pt demonstrated improved pain, tolerance to running and walking.  Pt will benefit from PT to " address impairments and restore function.    Goals  ST-4 weeks.  1.  Pt will decrease pain > 50%. met  2.  Pt will run 5 mi without pain.     LT-8 weeks.  1.  Pt will decrease pain > 75%.  2.  Pt will run 50K without pain.    Plan  Planned modality interventions: low level laser therapy    Planned therapy interventions: manual therapy, neuromuscular re-education, body mechanics training, strengthening, gait training and home exercise program    Frequency: 1x week  Duration in weeks: 8      Subjective Evaluation    History of Present Illness  Mechanism of injury: Pt is a 43 yof c/o an insidious onset of R plantar fascia pain that began in Oct 23.  Pt reports pain progressively got worse until Dec 23.  She then held running for 2 months with no change in pain.  Pt then began running with her dog 1-1.5 mi 3-4 x/ wk in January with slightly increased pain 30 min after running.  Patient Goals  Patient goals for therapy: decreased edema, decreased pain, independence with ADLs/IADLs, increased strength and return to sport/leisure activities  Patient goal: run a 50K  Pain  Current pain ratin  At best pain ratin  At worst pain rating: 3  Location: medial calcaneal tuberacle, post calcaneus  Quality: dull ache  Relieving factors: rest  Exacerbated by: 30 min after running, after prolonged standing and walking.  Progression: improved        Objective     Tenderness     Right Ankle/Foot   Tenderness in the medial calcaneus and plantar fascia.     Passive Range of Motion     Right Ankle/Foot    Dorsiflexion (ke): 20 degrees   Plantar flexion: 70 degrees   Inversion: 40 degrees   Eversion: 25 degrees   Great toe extension: 60 degrees     Strength/Myotome Testing     Right Ankle/Foot   Dorsiflexion: 5  Plantar flexion: 5  Inversion: 5  Eversion: 5    Additional Strength Details  Amb: normalized.  Custom orthotics for work shoes.

## 2024-07-09 NOTE — PROGRESS NOTES
PT Re-Evaluation     Today's date: 2024  Patient name: Elke Nation  : 1980  MRN: [id number]  Referring provider: Irma Sinclair*  Dx:   Encounter Diagnosis     ICD-10-CM    1. Plantar fasciitis, right  M72.2           Start Time: 1700  Stop Time: 1745  Total time in clinic (min): 45 minutes    Assessment  Impairments: abnormal gait, abnormal or restricted ROM, activity intolerance, impaired physical strength, lacks appropriate home exercise program and pain with function    Assessment details: Pt demonstrated improved pain, tolerance to running and walking.  Pt will benefit from PT to address impairments and restore function.    Goals  ST-4 weeks.  1.  Pt will decrease pain > 50%. met  2.  Pt will run 5 mi without pain.     LT-8 weeks.  1.  Pt will decrease pain > 75%.  2.  Pt will run 50K without pain.    Plan  Planned modality interventions: low level laser therapy    Planned therapy interventions: manual therapy, neuromuscular re-education, body mechanics training, strengthening, gait training and home exercise program    Frequency: 1x week  Duration in weeks: 8      Subjective Evaluation    History of Present Illness  Mechanism of injury: Pt is a 43 yof c/o an insidious onset of R plantar fascia pain that began in Oct 23.  Pt reports pain progressively got worse until Dec 23.  She then held running for 2 months with no change in pain.  Pt then began running with her dog 1-1.5 mi 3-4 x/ wk in January with slightly increased pain 30 min after running.  Patient Goals  Patient goals for therapy: decreased edema, decreased pain, independence with ADLs/IADLs, increased strength and return to sport/leisure activities  Patient goal: run a 50K  Pain  Current pain ratin  At best pain ratin  At worst pain rating: 3  Location: medial calcaneal tuberacle, post calcaneus  Quality: dull ache  Relieving factors: rest  Exacerbated by: 30 min after running, after prolonged standing and  walking.  Progression: improved        Objective     Tenderness     Right Ankle/Foot   Tenderness in the medial calcaneus and plantar fascia.     Passive Range of Motion     Right Ankle/Foot    Dorsiflexion (ke): 20 degrees   Plantar flexion: 70 degrees   Inversion: 40 degrees   Eversion: 25 degrees   Great toe extension: 60 degrees     Strength/Myotome Testing     Right Ankle/Foot   Dorsiflexion: 5  Plantar flexion: 5  Inversion: 5  Eversion: 5    Additional Strength Details  Amb: normalized.  Custom orthotics for work shoes.

## 2024-07-16 ENCOUNTER — OFFICE VISIT (OUTPATIENT)
Dept: PHYSICAL THERAPY | Facility: CLINIC | Age: 44
End: 2024-07-16
Payer: COMMERCIAL

## 2024-07-16 DIAGNOSIS — M72.2 PLANTAR FASCIITIS, RIGHT: Primary | ICD-10-CM

## 2024-07-16 PROCEDURE — 97110 THERAPEUTIC EXERCISES: CPT | Performed by: PHYSICAL THERAPIST

## 2024-07-16 PROCEDURE — 97140 MANUAL THERAPY 1/> REGIONS: CPT | Performed by: PHYSICAL THERAPIST

## 2024-07-17 NOTE — PROGRESS NOTES
"Daily Note     Today's date: 2024  Patient name: Elke Nation  : 1980  MRN: [id number]  Referring provider: Irma Sinclair*  Dx:   Encounter Diagnosis     ICD-10-CM    1. Plantar fasciitis, right  M72.2                      Subjective: Pt reports 2.5/10 PF pain at worst with running up to 6 mi and walking dogs for several hours on the same day.  Pt reports resolution of pain after 1.5 days with no sx currently.    Objective: See treatment diary below    Assessment: Pt demonstrated improved irritability, attempted Leg Press machine heelraises to increase strength slowly without increasing irritation, issued TB for HEP.      Plan: Continue per plan of care. Assess response to PF TB for HEP v. DL heelraises.  Assess PF strength nv.     Precautions: none.    Dx: R plantar fascitis, lumbar derangement with ext DP.    Manuals    Laser R plantar fascia 5800J         9000J   Graston R plantar fascia 10 min         10 min                             Neuro Re-Ed             SLS hip ABD             SLS             SLS rebounder pball             SL mini-squats             Standing DL towel scrunches             Seated towel scrunches          3#/ 3x30   Standing hip ABD              EIL             Ther Ex             SL ecc heel raises off step         3x12    DL/SL con/ecc heelraises on floor             DL/SL con/ecc heelraises off step          5#/ 2x10   prostretch             SL ecc heelraises on Leg Press Machine 40#/ 1x10, 60#/ 3x10            Standing PF stretch          15\"x3   TB PF 2 Carole, 1 Dom/ 3x10                                                   Ther Activity                                       Gait Training             Running assessment  10 min TM           Alter G: correct R hip ER, over-stride  15 min 80% BW 5.5 mph 15 min 80% 6 mph 10 min 90% 6 mph         Modalities                                              "

## 2024-07-30 ENCOUNTER — APPOINTMENT (OUTPATIENT)
Dept: PHYSICAL THERAPY | Facility: CLINIC | Age: 44
End: 2024-07-30
Payer: COMMERCIAL

## 2024-08-06 ENCOUNTER — OFFICE VISIT (OUTPATIENT)
Dept: PHYSICAL THERAPY | Facility: CLINIC | Age: 44
End: 2024-08-06
Payer: COMMERCIAL

## 2024-08-06 DIAGNOSIS — M72.2 PLANTAR FASCIITIS, RIGHT: Primary | ICD-10-CM

## 2024-08-06 PROCEDURE — 97140 MANUAL THERAPY 1/> REGIONS: CPT | Performed by: PHYSICAL THERAPIST

## 2024-08-06 PROCEDURE — 97110 THERAPEUTIC EXERCISES: CPT | Performed by: PHYSICAL THERAPIST

## 2024-08-06 NOTE — PROGRESS NOTES
"Daily Note     Today's date: 2024  Patient name: Elke Nation  : 1980  MRN: [id number]  Referring provider: Irma Sinclair*  Dx:   Encounter Diagnosis     ICD-10-CM    1. Plantar fasciitis, right  M72.2                      Subjective: Pt reports 1/10 PF pain at worst with running 8 mi.    Objective: See treatment diary below    Assessment: Pt demonstrated improved irritability, continues to be limited by pain with progressions to PF resistance.    Plan: Continue per plan of care. HEP: DL/SL con/ecc heelraises 3x10 floor, 2 x/wk.    Precautions: none.    Dx: R plantar fascitis, lumbar derangement with ext DP.    Manuals    Laser R plantar fascia 5800J 5800J        9000J   Graston R plantar fascia 10 min 10 min        10 min                             Neuro Re-Ed             SLS hip ABD             SLS             SLS rebounder pball             SL mini-squats             Standing DL towel scrunches             Seated towel scrunches          3#/ 3x30   Standing hip ABD              EIL             Ther Ex             SL ecc heel raises off step         3x12    DL/SL con/ecc heelraises on floor             DL/SL con/ecc heelraises off step          5#/ 2x10   prostretch             SL ecc heelraises on Leg Press Machine 40#/ 1x10, 60#/ 3x10 60#/ 1x10, 65#/ 1x10, 70#/ 3x10           Standing PF stretch          15\"x3   TB PF 2 Carole, 1 Dom/ 3x10                                                   Ther Activity                                       Gait Training             Running assessment             Alter G: correct R hip ER, over-stride             Modalities                                              "
Patient is a 67y old  Female who presents with a chief complaint of Mechanical Fall and LOC (14 Jan 2019 19:51)      INTERVAL HPI/OVERNIGHT EVENTS: c/o ongoing subscapular pain, denies CP, sob        REVIEW OF SYSTEMS:  CONSTITUTIONAL: No fever, chills  NECK: No pain or stiffness  RESPIRATORY: No cough, SOB  CARDIOVASCULAR: No chest pain, palpitations, +ve subscapular pain   GASTROINTESTINAL: No abdominal pain. No nausea, vomiting, or diarrhea  GENITOURINARY: No dysuria  NEUROLOGICAL: No HA  MUSCULOSKELETAL: No joint pain or swelling; No muscle, back, or extremity pain    T(C): 36.8 (01-15-19 @ 11:15), Max: 36.9 (01-15-19 @ 08:00)  HR: 63 (01-15-19 @ 11:15) (52 - 88)  BP: 155/67 (01-15-19 @ 11:15) (132/76 - 157/78)  RR: 16 (01-15-19 @ 11:15) (16 - 20)  SpO2: 98% (01-15-19 @ 11:15) (95% - 99%)  Wt(kg): --Vital Signs Last 24 Hrs  T(C): 36.8 (15 Brice 2019 11:15), Max: 36.9 (15 Brice 2019 08:00)  T(F): 98.2 (15 Brice 2019 11:15), Max: 98.4 (15 Brice 2019 08:00)  HR: 63 (15 Brice 2019 11:15) (52 - 88)  BP: 155/67 (15 Brice 2019 11:15) (132/76 - 157/78)  BP(mean): --  RR: 16 (15 Brice 2019 11:15) (16 - 20)  SpO2: 98% (15 Brice 2019 11:15) (95% - 99%)    MEDICATIONS  (STANDING):  aspirin  chewable 81 milliGRAM(s) Oral daily  atorvastatin 40 milliGRAM(s) Oral at bedtime  cloNIDine 0.1 milliGRAM(s) Oral three times a day  enoxaparin Injectable 40 milliGRAM(s) SubCutaneous daily  metoprolol tartrate 25 milliGRAM(s) Oral two times a day    MEDICATIONS  (PRN):      PHYSICAL EXAM:  GENERAL: NAD  EYES: clear conjunctiva  ENMT: Moist mucous membranes  NECK: Supple, No JVD  CHEST/LUNG: Clear to auscultation bilaterally; No rales, rhonchi, wheezing, or rubs  HEART: S1, S2, Regular rate and rhythm  ABDOMEN: Soft, Nontender, Nondistended; Bowel sounds present  NEURO: Alert & Oriented X3  EXTREMITIES: No LE edema, no calf tenderness  SKIN: No rashes or lesions, left hip, right arm ecchymosis     Consultant(s) Notes Reviewed:  [x ] YES  [ ] NO  Care Discussed with Consultants/Other Providers [ x] YES  [ ] NO    LABS:                        13.3   6.1   )-----------( 233      ( 15 Brice 2019 06:24 )             41.2     01-15    144  |  111<H>  |  14  ----------------------------<  81  4.0   |  26  |  0.82    Ca    8.3<L>      15 Brice 2019 06:24  Phos  4.5     01-15  Mg     2.0     01-15    TPro  7.7  /  Alb  3.7  /  TBili  0.3  /  DBili  x   /  AST  18  /  ALT  29  /  AlkPhos  96  01-14    PTT - ( 14 Jan 2019 20:19 )  PTT:31.1 sec    CAPILLARY BLOOD GLUCOSE      POCT Blood Glucose.: 135 mg/dL (14 Jan 2019 13:21)      RADIOLOGY & ADDITIONAL TESTS:    < from: CT Cervical Spine No Cont (01.14.19 @ 15:44) >  EXAM:  CT CERVICAL SPINE                          EXAM:  CT BRAIN                            PROCEDURE DATE:  01/14/2019          INTERPRETATION:  CLINICAL STATEMENT: Trauma..    TECHNIQUE: CT of the head and cervical spine were performed without IV   contrast. 3-D/MIP images obtained    COMPARISON: None.    FINDINGS:  Head:    There is no acute intracranial hemorrhage, parenchymal mass, mass effect   or midline shift. There is no acute territorial infarct. There is no   hydrocephalus.    The cranium is intact.         Mucosal thickening paranasal sinuses.    Cervical spine:  Reversal of cervical lordosis. Mild loss of height of C5 and C6 vertebral   bodies likely chronic. Sagittal alignment intact.    There is no prevertebral soft tissue swelling. The odontoid is intact.     Evaluation of the spinal canal is limited on a CT exam.  Multilevel   degenerative changes noted resulting in multilevel spinal canal stenosis   and neural foraminal narrowing.    IMPRESSION:  No acute intracranialhemorrhage.    No acute fracture cervical spine.          < from: CT Head No Cont (01.14.19 @ 15:44) >  EXAM:  CT CERVICAL SPINE                          EXAM:  CT BRAIN                            PROCEDURE DATE:  01/14/2019          INTERPRETATION:  CLINICAL STATEMENT: Trauma..    TECHNIQUE: CT of the head and cervical spine were performed without IV   contrast. 3-D/MIP images obtained    COMPARISON: None.    FINDINGS:  Head:    There is no acute intracranial hemorrhage, parenchymal mass, mass effect   or midline shift. There is no acute territorial infarct. There is no   hydrocephalus.    The cranium is intact.         Mucosal thickening paranasal sinuses.    Cervical spine:  Reversal of cervical lordosis. Mild loss of height of C5 and C6 vertebral   bodies likely chronic. Sagittal alignment intact.    There is no prevertebral soft tissue swelling. The odontoid is intact.     Evaluation of the spinal canal is limited on a CT exam.  Multilevel   degenerative changes noted resulting in multilevel spinal canal stenosis   and neural foraminal narrowing.    IMPRESSION:  No acute intracranialhemorrhage.    No acute fracture cervical spine    < end of copied text >    < end of copied text >          Imaging Personally Reviewed:  [ ] YES  [ ] NO
Airborne+Contact precautions

## 2024-08-13 ENCOUNTER — OFFICE VISIT (OUTPATIENT)
Dept: PHYSICAL THERAPY | Facility: CLINIC | Age: 44
End: 2024-08-13
Payer: COMMERCIAL

## 2024-08-13 DIAGNOSIS — M72.2 PLANTAR FASCIITIS, RIGHT: Primary | ICD-10-CM

## 2024-08-13 PROCEDURE — 97140 MANUAL THERAPY 1/> REGIONS: CPT | Performed by: PHYSICAL THERAPIST

## 2024-08-13 PROCEDURE — 97110 THERAPEUTIC EXERCISES: CPT | Performed by: PHYSICAL THERAPIST

## 2024-08-13 NOTE — PROGRESS NOTES
"Daily Note     Today's date: 2024  Patient name: Elke Nation  : 1980  MRN: [id number]  Referring provider: Irma Sinclair*  Dx:   Encounter Diagnosis     ICD-10-CM    1. Plantar fasciitis, right  M72.2                      Subjective: Pt reports 1-2/10 PF pain at worst with running 8 mi.    Objective: See treatment diary below    Assessment: Ordered an orthotic for sneakers to improve stability and pain control.  Pt demonstrated slowly improving PF strength.      Plan: Continue per plan of care. HEP: DL/SL con/ecc heelraises 2x10 step, every other day.    Precautions: none.    Dx: R plantar fascitis, lumbar derangement with ext DP.    Manuals    Laser R plantar fascia 5800J 5800J 5800J       9000J   Graston R plantar fascia 10 min 10 min 10 min       10 min                             Neuro Re-Ed             SLS hip ABD             SLS             SLS rebounder pball             SL mini-squats             Standing DL towel scrunches             Seated towel scrunches   3#/ 3x15       3#/ 3x30   Standing hip ABD              EIL             Ther Ex             SL ecc heel raises off step         3x12    DL/SL con/ecc heelraises on floor             DL/SL con/ecc heelraises off step   1x10,       5#/ 2x10   prostretch             SL ecc heelraises on Leg Press Machine 40#/ 1x10, 60#/ 3x10 60#/ 1x10, 65#/ 1x10, 70#/ 3x10 60#/ 1x10, 65#/ 1x10, 72.5#/ 3x10 60#/ 1x10, 70#/ 1x10, 75#/ 3x10         Standing PF stretch          15\"x3   TB PF 2 Carole, 1 Dom/ 3x10                                                   Ther Activity                                       Gait Training             Running assessment             Alter G: correct R hip ER, over-stride             Modalities                                              "

## 2024-08-20 ENCOUNTER — OFFICE VISIT (OUTPATIENT)
Dept: PHYSICAL THERAPY | Facility: CLINIC | Age: 44
End: 2024-08-20
Payer: COMMERCIAL

## 2024-08-20 DIAGNOSIS — M72.2 PLANTAR FASCIITIS, RIGHT: Primary | ICD-10-CM

## 2024-08-20 PROCEDURE — 97140 MANUAL THERAPY 1/> REGIONS: CPT | Performed by: PHYSICAL THERAPIST

## 2024-08-20 PROCEDURE — 97110 THERAPEUTIC EXERCISES: CPT | Performed by: PHYSICAL THERAPIST

## 2024-08-20 NOTE — PROGRESS NOTES
"Daily Note     Today's date: 2024  Patient name: Elke Nation  : 1980  MRN: [id number]  Referring provider: Irma Sinclair*  Dx:   Encounter Diagnosis     ICD-10-CM    1. Plantar fasciitis, right  M72.2                      Subjective: Pt reports 0-1/10 PF pain at worst. Pt reports HEP of DL/SL con/ecc heelraises 3x/wk.    Objective: See treatment diary below    Assessment: Pt demonstrated improved irritation and PF strength.    Plan: Continue per plan of care. HEP: DL/SL con/ecc heelraises 2x10 step, every other day.    Precautions: none.    Dx: R plantar fascitis, lumbar derangement with ext DP.    Manuals    Laser R plantar fascia 5800J 5800J 5800J 5800J      9000J   Graston R plantar fascia 10 min 10 min 10 min 10 min      10 min                             Neuro Re-Ed             SLS hip ABD             SLS             SLS rebounder pball             SL mini-squats             Standing DL towel scrunches             Seated towel scrunches   3#/ 3x15 3#/ 3x15      3#/ 3x30   Standing hip ABD              EIL             Ther Ex             SL ecc heel raises off step         3x12    DL/SL con/ecc heelraises on floor             DL/SL con/ecc heelraises off step   1x10,       5#/ 2x10   prostretch             SL ecc heelraises on Leg Press Machine 40#/ 1x10, 60#/ 3x10 60#/ 1x10, 65#/ 1x10, 70#/ 3x10 60#/ 1x10, 65#/ 1x10, 72.5#/ 3x10 60#/ 1x10, 70#/ 1x10, 75#/ 3x10         Standing PF stretch          15\"x3   TB PF 2 Carole, 1 Dom/ 3x10            Towel PF/ GA stretch    10\"x3                                   Ther Activity                                       Gait Training             Running assessment             Alter G: correct R hip ER, over-stride             Modalities                                              "

## 2024-08-27 ENCOUNTER — OFFICE VISIT (OUTPATIENT)
Dept: PHYSICAL THERAPY | Facility: CLINIC | Age: 44
End: 2024-08-27
Payer: COMMERCIAL

## 2024-08-27 DIAGNOSIS — M72.2 PLANTAR FASCIITIS, RIGHT: Primary | ICD-10-CM

## 2024-08-27 PROCEDURE — L3010 FOOT LONGITUDINAL ARCH SUPPO: HCPCS | Performed by: PHYSICAL THERAPIST

## 2024-08-27 PROCEDURE — 97110 THERAPEUTIC EXERCISES: CPT | Performed by: PHYSICAL THERAPIST

## 2024-08-27 NOTE — PROGRESS NOTES
"Daily Note     Today's date: 2024  Patient name: Elke Nation  : 1980  MRN: [id number]  Referring provider: Irma Sinclair*  Dx:   Encounter Diagnosis     ICD-10-CM    1. Plantar fasciitis, right  M72.2                      Subjective: Pt reports 0-1/10 PF pain at worst. Pt reports running up to 10 mi with min pain.    Objective: See treatment diary below    Assessment: Pt demonstrated improved running tolerance.  Orthotics arrived.  Orthotics fit sneaker well, provided improved pronation control and calcaneus stability.  Wearing schedule was reviewed with pt.    Plan: Continue per plan of care. HEP: DL/SL con/ecc heelraises 2x10 step, SL ecc heelraises step 1x10 every other day.    Precautions: none.    Dx: R plantar fascitis, lumbar derangement with ext DP.    Manuals    Laser R plantar fascia 5800J 5800J 5800J 5800J 5000J     9000J   Graston R plantar fascia 10 min 10 min 10 min 10 min 10 min     10 min                             Neuro Re-Ed             SLS hip ABD             SLS             SLS rebounder pball             SL mini-squats             Standing DL towel scrunches             Seated towel scrunches   3#/ 3x15 3#/ 3x15      3#/ 3x30   Standing hip ABD              EIL             Ther Ex             SL ecc heel raises off step         3x12    DL/SL con/ecc heelraises on floor             DL/SL con/ecc heelraises off step   1x10,       5#/ 2x10   prostretch             SL ecc heelraises on Leg Press Machine 40#/ 1x10, 60#/ 3x10 60#/ 1x10, 65#/ 1x10, 70#/ 3x10 60#/ 1x10, 65#/ 1x10, 72.5#/ 3x10 60#/ 1x10, 70#/ 1x10, 75#/ 3x10 60#/ 1x10, 70#/ 1x10, 80#/ 3x10        Standing PF stretch          15\"x3   TB PF 2 Carole, 1 Dom/ 3x10            Towel PF/ GA stretch    10\"x3                                   Ther Activity                                       Gait Training             Running assessment             Alter G: correct R hip ER, over-stride       "       Modalities

## 2024-09-03 ENCOUNTER — OFFICE VISIT (OUTPATIENT)
Dept: PHYSICAL THERAPY | Facility: CLINIC | Age: 44
End: 2024-09-03
Payer: COMMERCIAL

## 2024-09-03 DIAGNOSIS — M72.2 PLANTAR FASCIITIS, RIGHT: Primary | ICD-10-CM

## 2024-09-03 PROCEDURE — 97140 MANUAL THERAPY 1/> REGIONS: CPT | Performed by: PHYSICAL THERAPIST

## 2024-09-03 PROCEDURE — 97110 THERAPEUTIC EXERCISES: CPT | Performed by: PHYSICAL THERAPIST

## 2024-09-03 NOTE — PROGRESS NOTES
"Daily Note     Today's date: 9/3/2024  Patient name: Elke Nation  : 1980  MRN: [id number]  Referring provider: Irma Sinclair*  Dx:   Encounter Diagnosis     ICD-10-CM    1. Plantar fasciitis, right  M72.2                      Subjective: Pt reports 2/10 PF pain at worst with running on . Pt reports running up to 10 mi with min pain.    Objective: See treatment diary below    Assessment: Pt demonstrated improved running tolerance.  Orthotics arrived.  Orthotics fit sneaker well, provided improved pronation control and calcaneus stability.  Wearing schedule was reviewed with pt.    Plan: Continue per plan of care. HEP: DL/SL con/ecc heelraises 2x10 step, SL ecc heelraises step 1x10 every other day.    Precautions: none.    Dx: R plantar fascitis, lumbar derangement with ext DP.    Manuals 7/16 8/6 8/13 8/20 8/27 9/3    7/9   Laser R plantar fascia [id number] [id number] 5800J 5800J 5000J 5000J    9000J   Graston R plantar fascia 10 min 10 min 10 min 10 min 10 min 12 min    10 min                             Neuro Re-Ed             SLS hip ABD             SLS             SLS rebounder pball             SL mini-squats             Standing DL towel scrunches             Seated towel scrunches   3#/ 3x15 3#/ 3x15      3#/ 3x30   Standing hip ABD              EIL             Ther Ex             SL ecc heel raises off step         3x12    DL/SL con/ecc heelraises on floor             DL/SL con/ecc heelraises off step   1x10,       5#/ 2x10   prostretch             SL ecc heelraises on Leg Press Machine 40#/ 1x10, 60#/ 3x10 60#/ 1x10, 65#/ 1x10, 70#/ 3x10 60#/ 1x10, 65#/ 1x10, 72.5#/ 3x10 60#/ 1x10, 70#/ 1x10, 75#/ 3x10 60#/ 1x10, 70#/ 1x10, 80#/ 3x10 62.5#/ 1x10, 72.5#/ 1x10, 82.5#/ 3x10       Standing PF stretch          15\"x3   TB PF 2 Carole, 1 Dom/ 3x10            Towel PF/ GA stretch    10\"x3                                   Ther Activity                                       Gait Training             Running " assessment             Alter G: correct R hip ER, over-stride             Modalities

## 2024-09-17 ENCOUNTER — OFFICE VISIT (OUTPATIENT)
Dept: PHYSICAL THERAPY | Facility: CLINIC | Age: 44
End: 2024-09-17
Payer: COMMERCIAL

## 2024-09-17 DIAGNOSIS — M72.2 PLANTAR FASCIITIS, RIGHT: Primary | ICD-10-CM

## 2024-09-17 PROCEDURE — 97140 MANUAL THERAPY 1/> REGIONS: CPT | Performed by: PHYSICAL THERAPIST

## 2024-09-17 PROCEDURE — 97110 THERAPEUTIC EXERCISES: CPT | Performed by: PHYSICAL THERAPIST

## 2024-09-17 NOTE — PROGRESS NOTES
"Daily Note     Today's date: 2024  Patient name: Elke Nation  : 1980  MRN: [id number]  Referring provider: Irma Sinclair*  Dx:   Encounter Diagnosis     ICD-10-CM    1. Plantar fasciitis, right  M72.2                      Subjective: Pt reports no foot pain other than walking barefoot x 1 wk.  Pt reports running up to 12 mi without pain.    Objective: See treatment diary below    Assessment: Pt demonstrated improved running tolerance and irritability.  Pt has not worm orthotics in sneakers b/c she was feeling so good.    Plan: Continue per plan of care. HEP: SL ecc heelraises step 2x12 every other day.    Precautions: none.    Dx: R plantar fascitis, lumbar derangement with ext DP.    Manuals 7/16 8/6 8/13 8/20 8/27 9/3 9/17   7/9   Laser R plantar fascia [id number] 5800J 5800J 5000J 5000J 5000J   9000J   Graston R plantar fascia 10 min 10 min 10 min 10 min 10 min 12 min 12 min   10 min                             Neuro Re-Ed             SLS hip ABD             SLS             SLS rebounder pball             SL mini-squats             Standing DL towel scrunches             Seated towel scrunches   3#/ 3x15 3#/ 3x15      3#/ 3x30   Standing hip ABD              EIL             Ther Ex             SL ecc heel raises off step      HEP 2x12   3x12    DL/SL con/ecc heelraises on floor             DL/SL con/ecc heelraises off step   1x10,       5#/ 2x10   prostretch             SL ecc heelraises on Leg Press Machine 40#/ 1x10, 60#/ 3x10 60#/ 1x10, 65#/ 1x10, 70#/ 3x10 60#/ 1x10, 65#/ 1x10, 72.5#/ 3x10 60#/ 1x10, 70#/ 1x10, 75#/ 3x10 60#/ 1x10, 70#/ 1x10, 80#/ 3x10 62.5#/ 1x10, 72.5#/ 1x10, 82.5#/ 3x10 65#/ 1x10, 75#/ 1x10, 85#/ 3x10      Standing PF stretch          15\"x3   TB PF 2 Carole, 1 Dom/ 3x10            Towel PF/ GA stretch    10\"x3                                   Ther Activity                                       Gait Training             Running assessment             Alter G: correct " R hip ER, over-stride             Modalities

## 2024-10-01 ENCOUNTER — OFFICE VISIT (OUTPATIENT)
Dept: PHYSICAL THERAPY | Facility: CLINIC | Age: 44
End: 2024-10-01
Payer: COMMERCIAL

## 2024-10-01 DIAGNOSIS — M72.2 PLANTAR FASCIITIS, RIGHT: Primary | ICD-10-CM

## 2024-10-01 PROCEDURE — 97140 MANUAL THERAPY 1/> REGIONS: CPT | Performed by: PHYSICAL THERAPIST

## 2024-10-01 PROCEDURE — 97110 THERAPEUTIC EXERCISES: CPT | Performed by: PHYSICAL THERAPIST

## 2024-10-01 NOTE — PROGRESS NOTES
"Daily Note     Today's date: 10/1/2024  Patient name: Elke Nation  : 1980  MRN: [id number]  Referring provider: Irma Sinclair*  Dx:   Encounter Diagnosis     ICD-10-CM    1. Plantar fasciitis, right  M72.2                      Subjective: Pt reports 1.5/10 heel pain after performing 3 sets of 10 heel raises off a step.  Pt reports running up to 13.1 mi without pain.    Objective: See treatment diary below.    Assessment: Pt demonstrated improved running tolerance and irritability.      Plan: Continue per plan of care. HEP: SL ecc heelraises step 3x9 every other day.    Precautions: none.    Dx: R plantar fascitis, lumbar derangement with ext DP.    Manuals 7/16 8/6 8/13 8/20 8/27 9/3 9/17 10/1  7/9   Laser R plantar fascia [id number] 5800J 5800J 5000J 5000J 5000J [id number]  9000J   Graston R plantar fascia 10 min 10 min 10 min 10 min 10 min 12 min 12 min 12 min  10 min                             Neuro Re-Ed             SLS hip ABD             SLS             SLS rebounder pball             SL mini-squats             Standing DL towel scrunches             Seated towel scrunches   3#/ 3x15 3#/ 3x15      3#/ 3x30   Standing hip ABD              EIL             Ther Ex             SL ecc heel raises off step      HEP 2x12   3x12    DL/SL con/ecc heelraises on floor             DL/SL con/ecc heelraises off step   1x10,       5#/ 2x10   prostretch             SL ecc heelraises on Leg Press Machine 40#/ 1x10, 60#/ 3x10 60#/ 1x10, 65#/ 1x10, 70#/ 3x10 60#/ 1x10, 65#/ 1x10, 72.5#/ 3x10 60#/ 1x10, 70#/ 1x10, 75#/ 3x10 60#/ 1x10, 70#/ 1x10, 80#/ 3x10 62.5#/ 1x10, 72.5#/ 1x10, 82.5#/ 3x10 65#/ 1x10, 75#/ 1x10, 85#/ 3x10 67.5#/ 1x10, 80#/ 1x10, 87.5#/ 3x10     Standing PF stretch          15\"x3   TB PF 2 Carole, 1 Dom/ 3x10            Towel PF/ GA stretch    10\"x3                                   Ther Activity                                       Gait Training             Running assessment             Alter " G: correct R hip ER, over-stride             Modalities

## 2024-10-08 ENCOUNTER — EVALUATION (OUTPATIENT)
Dept: PHYSICAL THERAPY | Facility: CLINIC | Age: 44
End: 2024-10-08
Payer: COMMERCIAL

## 2024-10-08 DIAGNOSIS — M72.2 PLANTAR FASCIITIS, RIGHT: Primary | ICD-10-CM

## 2024-10-08 PROCEDURE — 97140 MANUAL THERAPY 1/> REGIONS: CPT | Performed by: PHYSICAL THERAPIST

## 2024-10-08 PROCEDURE — 97110 THERAPEUTIC EXERCISES: CPT | Performed by: PHYSICAL THERAPIST

## 2024-10-08 NOTE — PROGRESS NOTES
"Daily Note     Today's date: 10/8/2024  Patient name: Elke Nation  : 1980  MRN: [id number]  Referring provider: Irma Sinclair*  Dx:   Encounter Diagnosis     ICD-10-CM    1. Plantar fasciitis, right  M72.2                      Subjective: Pt reports 1.5/10 heel pain after performing 3 sets of 10 heel raises off a step.  Pt reports running up to 13.1 mi without pain.    Objective: See treatment diary below.    Assessment: Pt demonstrated improved running tolerance and irritability.      Plan: Continue per plan of care. HEP: SL ecc heelraises step 3x9 every other day.    Precautions: none.    Dx: R plantar fascitis, lumbar derangement with ext DP.    Manuals 7/16 8/6 8/13 8/20 8/27 9/3 9/17 10/1 10/8    Laser R plantar fascia 5800J 5800J 5800J 5800J 5000J 5000J 5000J [id number] [id number]    Graston R plantar fascia 10 min 10 min 10 min 10 min 10 min 12 min 12 min 12 min 12 min                              Neuro Re-Ed             SLS hip ABD             SLS             SLS rebounder pball             SL mini-squats             Standing DL towel scrunches             Seated towel scrunches   3#/ 3x15 3#/ 3x15         Standing hip ABD              EIL             Ther Ex             SL ecc heel raises off step      HEP 2x12   3x10 HEP    DL/SL con/ecc heelraises on floor             DL/SL con/ecc heelraises off step   1x10,          prostretch             SL ecc heelraises on Leg Press Machine 40#/ 1x10, 60#/ 3x10 60#/ 1x10, 65#/ 1x10, 70#/ 3x10 60#/ 1x10, 65#/ 1x10, 72.5#/ 3x10 60#/ 1x10, 70#/ 1x10, 75#/ 3x10 60#/ 1x10, 70#/ 1x10, 80#/ 3x10 62.5#/ 1x10, 72.5#/ 1x10, 82.5#/ 3x10 65#/ 1x10, 75#/ 1x10, 85#/ 3x10 67.5#/ 1x10, 80#/ 1x10, 87.5#/ 3x10 70#/ 1x10, 80#/ 1x10, 90#/ 3x10 90#/ 3x10   Standing PF stretch             TB PF 2 Carole, 1 Dom/ 3x10            Towel PF/ GA stretch    10\"x3                                   Ther Activity                                       Gait Training             Running " assessment             Alter G: correct R hip ER, over-stride             Modalities

## 2024-10-15 ENCOUNTER — OFFICE VISIT (OUTPATIENT)
Dept: PHYSICAL THERAPY | Facility: CLINIC | Age: 44
End: 2024-10-15
Payer: COMMERCIAL

## 2024-10-15 DIAGNOSIS — M72.2 PLANTAR FASCIITIS, RIGHT: Primary | ICD-10-CM

## 2024-10-15 PROCEDURE — 97110 THERAPEUTIC EXERCISES: CPT | Performed by: PHYSICAL THERAPIST

## 2024-10-15 NOTE — PROGRESS NOTES
"Daily Note     Today's date: 10/15/2024  Patient name: Elke Nation  : 1980  MRN: [id number]  Referring provider: Irma Sinclair*  Dx:   Encounter Diagnosis     ICD-10-CM    1. Plantar fasciitis, right  M72.2                      Subjective: Pt reports 1/10 heel pain at worst this week, frequently no pain, running mileage up to 6 mi this week.    Objective: See treatment diary below.    Assessment: Pt demonstrated improved running tolerance and irritability.      Plan: Continue per plan of care. HEP: SL ecc heelraises step 3x10 every other day.    Precautions: none.    Dx: R plantar fascitis, lumbar derangement with ext DP.    Manuals 7/16 8/6 8/13 8/20 8/27 9/3 9/17 10/1 10/8 10/15   Laser R plantar fascia 5800J 5800J 5800J 5800J 5000J 5000J 5000J [id number] 6000J 6000J   Graston R plantar fascia 10 min 10 min 10 min 10 min 10 min 12 min 12 min 12 min 12 min 12 min                             Neuro Re-Ed             SLS hip ABD             SLS             SLS rebounder pball             SL mini-squats             Standing DL towel scrunches             Seated towel scrunches   3#/ 3x15 3#/ 3x15         Standing hip ABD              EIL             Ther Ex             SL ecc heel raises off step      HEP 2x12   3x10 HEP    DL/SL con/ecc heelraises on floor             DL/SL con/ecc heelraises off step   1x10,          prostretch             SL ecc heelraises on Leg Press Machine 40#/ 1x10, 60#/ 3x10 60#/ 1x10, 65#/ 1x10, 70#/ 3x10 60#/ 1x10, 65#/ 1x10, 72.5#/ 3x10 60#/ 1x10, 70#/ 1x10, 75#/ 3x10 60#/ 1x10, 70#/ 1x10, 80#/ 3x10 62.5#/ 1x10, 72.5#/ 1x10, 82.5#/ 3x10 65#/ 1x10, 75#/ 1x10, 85#/ 3x10 67.5#/ 1x10, 80#/ 1x10, 87.5#/ 3x10 70#/ 1x10, 80#/ 1x10, 90#/ 3x10 70#/ 1x10, 80#/ 1x10, 90#/ 3x10   Standing PF stretch             TB PF 2 Carole, 1 Dom/ 3x10            Towel PF/ GA stretch    10\"x3                                   Ther Activity                                       Gait Training           "   Running assessment             Alter G: correct R hip ER, over-stride             Modalities                                               20-Jul-2022 19:29

## 2024-10-22 ENCOUNTER — APPOINTMENT (OUTPATIENT)
Dept: PHYSICAL THERAPY | Facility: CLINIC | Age: 44
End: 2024-10-22
Payer: COMMERCIAL

## 2024-10-29 ENCOUNTER — OFFICE VISIT (OUTPATIENT)
Dept: PHYSICAL THERAPY | Facility: CLINIC | Age: 44
End: 2024-10-29
Payer: COMMERCIAL

## 2024-10-29 DIAGNOSIS — M72.2 PLANTAR FASCIITIS, RIGHT: Primary | ICD-10-CM

## 2024-10-29 PROCEDURE — 97110 THERAPEUTIC EXERCISES: CPT | Performed by: PHYSICAL THERAPIST

## 2024-10-29 PROCEDURE — 97140 MANUAL THERAPY 1/> REGIONS: CPT | Performed by: PHYSICAL THERAPIST

## 2024-10-29 NOTE — PROGRESS NOTES
"Daily Note     Today's date: 10/29/2024  Patient name: Elke Nation  : 1980  MRN: [id number]  Referring provider: Irma Sinclair*  Dx:   Encounter Diagnosis     ICD-10-CM    1. Plantar fasciitis, right  M72.2                      Subjective: Pt reports 1/10 heel pain at worst this week, frequently no pain, running mileage up to 6 mi this week.    Objective: See treatment diary below.    Assessment: Pt demonstrated improved running tolerance and irritability.      Plan: Continue per plan of care. HEP: SL ecc heelraises step 3x10 every other day.    Precautions: none.    Dx: R plantar fascitis, lumbar derangement with ext DP.    Manuals 7/16 8/6 8/13 8/20 8/27 9/3 9/17 10/1 10/8 10/29   Laser R plantar fascia [id number] [id number] 5800J 5800J 5000J 5000J 5000J [id number] [id number] 6000J   Graston R plantar fascia 10 min 10 min 10 min 10 min 10 min 12 min 12 min 12 min 12 min 12 min                             Neuro Re-Ed             SLS hip ABD             SLS             SLS rebounder pball             SL mini-squats             Standing DL towel scrunches             Seated towel scrunches   3#/ 3x15 3#/ 3x15         Standing hip ABD              EIL             Ther Ex             SL ecc heel raises off step      HEP 2x12   3x10 HEP    DL/SL con/ecc heelraises on floor             DL/SL con/ecc heelraises off step   1x10,          prostretch             SL ecc heelraises on Leg Press Machine 40#/ 1x10, 60#/ 3x10 60#/ 1x10, 65#/ 1x10, 70#/ 3x10 60#/ 1x10, 65#/ 1x10, 72.5#/ 3x10 60#/ 1x10, 70#/ 1x10, 75#/ 3x10 60#/ 1x10, 70#/ 1x10, 80#/ 3x10 62.5#/ 1x10, 72.5#/ 1x10, 82.5#/ 3x10 65#/ 1x10, 75#/ 1x10, 85#/ 3x10 67.5#/ 1x10, 80#/ 1x10, 87.5#/ 3x10 70#/ 1x10, 80#/ 1x10, 90#/ 3x10 70#/ 1x10, 80#/ 1x10, 90#/ 3x10   Standing PF stretch             TB PF 2 Carole, 1 Dom/ 3x10            Towel PF/ GA stretch    10\"x3                                   Ther Activity                                       Gait Training           "   Running assessment             Alter G: correct R hip ER, over-stride             Modalities

## 2025-01-02 ENCOUNTER — OFFICE VISIT (OUTPATIENT)
Age: 45
End: 2025-01-02

## 2025-01-02 VITALS
HEIGHT: 61 IN | BODY MASS INDEX: 20.77 KG/M2 | WEIGHT: 110 LBS | DIASTOLIC BLOOD PRESSURE: 82 MMHG | TEMPERATURE: 96.5 F | SYSTOLIC BLOOD PRESSURE: 122 MMHG | OXYGEN SATURATION: 96 % | HEART RATE: 70 BPM

## 2025-01-02 DIAGNOSIS — Z11.59 SCREENING FOR VIRAL DISEASE: ICD-10-CM

## 2025-01-02 DIAGNOSIS — M06.4 INFLAMMATORY POLYARTHROPATHY (HCC): Primary | ICD-10-CM

## 2025-01-02 DIAGNOSIS — Z79.899 HIGH RISK MEDICATION USE: ICD-10-CM

## 2025-01-02 NOTE — PROGRESS NOTES
Name: Elke Nation      : 1980      MRN: [id number]  Encounter Provider: Anna Aquino DO  Encounter Date: 2025   Encounter department: [facility] [address]  :  Assessment & Plan  Inflammatory polyarthropathy (HCC)  Patient is a 44-year-old female presenting to establish care for asymmetric inflammatory polyarthropathy maintained on methotrexate 15 mg weekly with folic acid supplements.  Currently denies prolonged morning stiffness or joint swelling.  No active synovitis or joint tenderness appreciated on exam today.  Will continue current dose of methotrexate for now.  Discussed if that she has recurrent flareups, may need to increase dose.  -Complete serologic testing today.  Will check for RF and CCP.    -Will also screen for hepatitis B, hepatitis C and TB given that patient is on methotrexate  -Continue methotrexate 15 mg once weekly  -Continue folic acid 1 mg daily while on methotrexate  -CBC and CMP every 3 months while on methotrexate.  Patient states that she just had labs done this week and she will send over the results  -Discussed avoiding alcohol while on methotrexate  -Counseled on holding methotrexate dose if concerns for active infection  -Vaccine counseling provided  Orders:    Quantiferon TB Gold Plus Assay; Future    Cyclic citrul peptide antibody, IgG; Future    RF Screen w/ Reflex to Titer; Future    High risk medication use  Patient's rheumatologic medication(s) require intensive monitoring for toxicity. Does not endorse any significant side effects.    Orders:    CBC and differential; Standing    Comprehensive metabolic panel; Standing    Screening for viral disease    Orders:    Hepatitis B core antibody, total; Future    Hepatitis B surface antibody; Future    Hepatitis B surface antigen; Future    Hepatitis C antibody; Future    RTC in 3 months     Pertinent Medical History   Patient was previously following with Dr. Greene for OA. In , patient was  experiencing left third finger swelling and stiffness as well as pain in the left wrist.  Patient reported about 1 hour of morning stiffness.  Patient tried meloxicam with no improvement.  Patient felt better on a prednisone taper.  Negative RF and CCP.  DAVID and HLA-B27 negative.  Due to concerns for possible inflammatory arthropathy, patient was started on methotrexate 10 mg weekly with folic acid supplements. MTX was increased to 15mg once weekly.      History of Present Illness   Elke Nation is a 44 y.o. female who presents to establish care for inflammatory arthritis.      HPI   Patient reports that she started developing left hand stiffness and swelling. Patient states that she thinks her symptoms may have started about a year before she saw rheumatology in 2021.  Patient states that sometimes she had pain in her knees as well.  Reports prolonged morning stiffness and swelling of the left wrist.  Patient states she was started on methotrexate 15 mg weekly with daily folic acid which helped significantly.  Patient states that they tried around March 2023 to reduce the dose to 10 mg weekly for methotrexate however she started getting frequent flareups and the dose was increased to 15 mg again.    Patient states that currently she takes methotrexate 15 mg once weekly with folic acid supplements.  Currently, denies any prolonged morning stiffness or joint swelling.  Patient does states that every couple of months she does notice that she gets stiffness and swelling in the left wrist and hand.  Patient states she feels like this last occurred in September.  Reports that the pain lasts anywhere between a few days to 2 weeks.  Reports she does not take anything additional when that occurs.    Denies history of psoriasis, IBD.    Family history: No known family history of autoimmune diseases    Review of Systems  Complete ROS conducted as per HPI. In addition, denies:  Fever  Photosensitive rash  Sicca  "symptoms  Recurrent oral ulcers  Muscle weakness  Uveitis  Dactylitis  Chest pain  SOB  Pleurisy  Gross hematuria  Foamy urine  Raynaud's  Joint issues other than noted above    Current Outpatient Medications on File Prior to Visit   Medication Sig Dispense Refill    folic acid (FOLVITE) 1 mg tablet Take 1,000 mcg by mouth daily      hydroquinone 4 % cream APPLY TO DARK ON CHEEKS NIGHTLY FOR 2 MONTHS AND THEN STOP FOR 1 MONTH AND THEN REPEAT      methotrexate 2.5 MG tablet TAKE 6 TABLETS EVERY WEEK BY ORAL ROUTE      multivitamin (THERAGRAN) TABS Take 1 tablet by mouth daily       No current facility-administered medications on file prior to visit.      Social History     Tobacco Use    Smoking status: Never    Smokeless tobacco: Never   Vaping Use    Vaping status: Never Used   Substance and Sexual Activity    Alcohol use: Not Currently    Drug use: Never    Sexual activity: Not Currently         Objective   /82 (BP Location: Right arm, Patient Position: Sitting, Cuff Size: Adult)   Pulse 70   Temp (!) 96.5 °F (35.8 °C) (Tympanic)   Ht 5' 1\" (1.549 m)   Wt 49.9 kg (110 lb)   SpO2 96%   BMI 20.78 kg/m²     Physical Exam  General appearance: normal appearing, no acute distress  Skin: normal, no rashes  HEENT: normal, moist oropharynx, no nasal or oral ulcers  Lymph nodes: no palpable adenopathy  Lungs: normal respiratory effort, comfortable on room air, lungs clear to auscultation b/l   Heart: normal heart sounds, normal rate, normal rhythm,  Abdomen: soft, normal bowel sounds, no tenderness  Neurologic: no obvious neurological deficits   Extremities: no edema, warm and well perfused     Musculoskeletal Exam:   - Observation: no obvious joint abnormalities    - Palpation: no joint tenderness  - Synovitis: absent  - Joint effusions: absent  - ROM: intact throughout  - Muscle Strength: 5/5 throughout       Recent labs:  Lab Results   Component Value Date/Time    SODIUM 137 05/06/2024 07:00 AM    K 3.5 " 05/06/2024 07:00 AM    BUN 7 05/06/2024 07:00 AM    CREATININE 0.66 05/06/2024 07:00 AM    GLUC 85 05/06/2024 07:00 AM    CALCIUM 9.0 05/06/2024 07:00 AM    AST 28 05/06/2024 07:00 AM    ALT 22 05/06/2024 07:00 AM    ALB 4.2 05/06/2024 07:00 AM    TP 6.4 05/06/2024 07:00 AM    EGFR 112 05/06/2024 07:00 AM     Lab Results   Component Value Date/Time    HGB 14.8 05/06/2024 07:00 AM    WBC 4.9 05/06/2024 07:00 AM     05/06/2024 07:00 AM         I have personally reviewed notes, labs, and imaging available in the chart.       Anna Aquino DO, CCD, [facility]

## 2025-01-02 NOTE — ASSESSMENT & PLAN NOTE
Patient is a 44-year-old female presenting to establish care for asymmetric inflammatory polyarthropathy maintained on methotrexate 15 mg weekly with folic acid supplements.  Currently denies prolonged morning stiffness or joint swelling.  No active synovitis or joint tenderness appreciated on exam today.  Will continue current dose of methotrexate for now.  Discussed if that she has recurrent flareups, may need to increase dose.  -Complete serologic testing today.  Will check for RF and CCP.    -Will also screen for hepatitis B, hepatitis C and TB given that patient is on methotrexate  -Continue methotrexate 15 mg once weekly  -Continue folic acid 1 mg daily while on methotrexate  -CBC and CMP every 3 months while on methotrexate.  Patient states that she just had labs done this week and she will send over the results  -Discussed avoiding alcohol while on methotrexate  -Counseled on holding methotrexate dose if concerns for active infection  -Vaccine counseling provided  Orders:    Quantiferon TB Gold Plus Assay; Future    Cyclic citrul peptide antibody, IgG; Future    RF Screen w/ Reflex to Titer; Future

## 2025-03-14 ENCOUNTER — TELEPHONE (OUTPATIENT)
Age: 45
End: 2025-03-14

## 2025-03-14 NOTE — TELEPHONE ENCOUNTER
Pt calling in because she didn't receive the lab slips for the CBC with Diff, CMP, and Quant Gold. She goes to [facility] and not [facility].     Asks if we can mail her the lab slips.

## 2025-04-01 NOTE — PROGRESS NOTES
Name: Elke Nation      : 1980      MRN: [id number]  Encounter Provider: Anna Aquino DO  Encounter Date: 2025   Encounter department: [facility] [address]  :  Assessment & Plan  Inflammatory polyarthropathy (HCC)  Patient is a 44-year-old female presenting for follow-up of asymmetric inflammatory polyarthropathy.  Patient currently denies prolonged morning stiffness or joint swelling.  Currently on p.o. methotrexate 15 mg weekly with folic acid supplements.  No active synovitis or joint tenderness on exam today.  Patient did report an episode of right wrist pain, however it was worse with use and no associated swelling.  Advised patient to monitor closely.  If patient has other joint involvement, can reassess and consider optimizing methotrexate dose if needed.  -Patient is overdue for monitoring labs, patient states she went to [facility] to get them done recently.  Will try to get results faxed over.  -Continue p.o. methotrexate 15 mg once weekly  -Continue folic acid 1 mg daily while on methotrexate  -CBC and CMP every 3 months while on methotrexate  - follow up on RF and CCP results as well as infectious screening labs.       High risk medication use  Patient's rheumatologic medication(s) require intensive monitoring for toxicity. Does not endorse any significant side effects.       RTC in 6 months     Pertinent Medical History   Inflammatory polyarthropathy  Patient was previously following with Dr. Greene for OA. In , patient was experiencing left third finger swelling and stiffness as well as pain in the left wrist.  Patient reported about 1 hour of morning stiffness.  Patient tried meloxicam with no improvement.  Patient felt better on a prednisone taper.  Negative RF and CCP.  DAVID and HLA-B27 negative.  Due to concerns for possible inflammatory arthropathy, patient was started on methotrexate 10 mg weekly with folic acid supplements. MTX was increased to 15mg once weekly.  "  -1/2/2025: Established care with me.  Patient doing well in terms of inflammatory joint pain.  Continue methotrexate 15 mg once weekly with folic acid supplements     History of Present Illness   Elke Nation is a 44 y.o. female who presents to establish care for inflammatory arthritis.      HPI     Patient states that she was experiencing pain in her right wrist for about 1 month.  Pain was worse with activity.  Denies prolonged morning stiffness or swelling.  No numbness or tingling.  Patient states it has gotten better the past few weeks.  Patient continues on p.o. methotrexate 15 mg once a week with folic acid supplements    Family history: No known family history of autoimmune diseases    Review of Systems  Complete ROS conducted as per HPI. In addition, denies:  Fever  Recurrent oral ulcers  Muscle weakness  Uveitis  Chest pain  SOB  Foamy urine  Joint issues other than noted above    Current Outpatient Medications on File Prior to Visit   Medication Sig Dispense Refill    folic acid (FOLVITE) 1 mg tablet Take 1,000 mcg by mouth daily      hydroquinone 4 % cream APPLY TO DARK ON CHEEKS NIGHTLY FOR 2 MONTHS AND THEN STOP FOR 1 MONTH AND THEN REPEAT      methotrexate 2.5 MG tablet TAKE 6 TABLETS EVERY WEEK BY ORAL ROUTE      multivitamin (THERAGRAN) TABS Take 1 tablet by mouth daily       No current facility-administered medications on file prior to visit.      Social History     Tobacco Use    Smoking status: Never    Smokeless tobacco: Never   Vaping Use    Vaping status: Never Used   Substance and Sexual Activity    Alcohol use: Not Currently    Drug use: Never    Sexual activity: Not Currently         Objective   /64 (BP Location: Left arm, Patient Position: Sitting, Cuff Size: Standard)   Pulse 62   Temp (!) 97.3 °F (36.3 °C) (Tympanic)   Ht 5' 1\" (1.549 m)   Wt 51 kg (112 lb 6.4 oz)   SpO2 100%   BMI 21.24 kg/m²     Physical Exam  General appearance: normal appearing, no acute " distress  Skin: normal, no rashes  HEENT: normal, moist oropharynx, no nasal or oral ulcers  Lymph nodes: no palpable adenopathy  Lungs: normal respiratory effort, comfortable on room air, lungs clear to auscultation b/l   Heart: normal heart sounds, normal rate, normal rhythm,  Abdomen: soft, normal bowel sounds, no tenderness  Neurologic: no obvious neurological deficits   Extremities: no edema, warm and well perfused     Musculoskeletal Exam:   - Observation: no obvious joint abnormalities    - Palpation: no joint tenderness  - Synovitis: absent  - Joint effusions: absent  - ROM: intact throughout  - Muscle Strength: 5/5 throughout       Recent labs:  Lab Results   Component Value Date/Time    SODIUM 137 05/06/2024 07:00 AM    K 3.5 05/06/2024 07:00 AM    BUN 7 05/06/2024 07:00 AM    CREATININE 0.66 05/06/2024 07:00 AM    GLUC 85 05/06/2024 07:00 AM    CALCIUM 9.0 05/06/2024 07:00 AM    AST 28 05/06/2024 07:00 AM    ALT 22 05/06/2024 07:00 AM    ALB 4.2 05/06/2024 07:00 AM    TP 6.4 05/06/2024 07:00 AM    EGFR 112 05/06/2024 07:00 AM     Lab Results   Component Value Date/Time    HGB 14.8 05/06/2024 07:00 AM    WBC 4.9 05/06/2024 07:00 AM     05/06/2024 07:00 AM         I have personally reviewed notes, labs, and imaging available in the chart.       Anna Aquino DO, CCD, [facility]

## 2025-04-02 ENCOUNTER — TELEPHONE (OUTPATIENT)
Age: 45
End: 2025-04-02

## 2025-04-02 ENCOUNTER — OFFICE VISIT (OUTPATIENT)
Age: 45
End: 2025-04-02
Payer: COMMERCIAL

## 2025-04-02 VITALS
DIASTOLIC BLOOD PRESSURE: 64 MMHG | BODY MASS INDEX: 21.22 KG/M2 | SYSTOLIC BLOOD PRESSURE: 114 MMHG | OXYGEN SATURATION: 100 % | TEMPERATURE: 97.3 F | HEIGHT: 61 IN | HEART RATE: 62 BPM | WEIGHT: 112.4 LBS

## 2025-04-02 DIAGNOSIS — M06.4 INFLAMMATORY POLYARTHROPATHY (HCC): Primary | ICD-10-CM

## 2025-04-02 DIAGNOSIS — Z79.899 HIGH RISK MEDICATION USE: ICD-10-CM

## 2025-04-02 PROCEDURE — 99214 OFFICE O/P EST MOD 30 MIN: CPT | Performed by: STUDENT IN AN ORGANIZED HEALTH CARE EDUCATION/TRAINING PROGRAM

## 2025-04-02 NOTE — TELEPHONE ENCOUNTER
Received fax from [facility] [employer] with labs results.     Please let patient know that blood work looked good and she can continue current dose of methotrexate. Patient to let us know if she has any worsening symptoms. Thank you

## 2025-04-02 NOTE — TELEPHONE ENCOUNTER
Patient reports she had labs done at [facility].  Can you please call to see if we can get results faxed over.  Thank you

## 2025-04-02 NOTE — ASSESSMENT & PLAN NOTE
Patient is a 44-year-old female presenting for follow-up of asymmetric inflammatory polyarthropathy.  Patient currently denies prolonged morning stiffness or joint swelling.  Currently on p.o. methotrexate 15 mg weekly with folic acid supplements.  No active synovitis or joint tenderness on exam today.  Patient did report an episode of right wrist pain, however it was worse with use and no associated swelling.  Advised patient to monitor closely.  If patient has other joint involvement, can reassess and consider optimizing methotrexate dose if needed.  -Patient is overdue for monitoring labs, patient states she went to [facility] to get them done recently.  Will try to get results faxed over.  -Continue p.o. methotrexate 15 mg once weekly  -Continue folic acid 1 mg daily while on methotrexate  -CBC and CMP every 3 months while on methotrexate  - follow up on RF and CCP results as well as infectious screening labs.

## 2025-04-16 ENCOUNTER — TRANSCRIBE ORDERS (OUTPATIENT)
Age: 45
End: 2025-04-16

## 2025-05-16 ENCOUNTER — ANNUAL EXAM (OUTPATIENT)
Dept: OBGYN CLINIC | Facility: CLINIC | Age: 45
End: 2025-05-16

## 2025-05-16 VITALS
SYSTOLIC BLOOD PRESSURE: 110 MMHG | WEIGHT: 112 LBS | DIASTOLIC BLOOD PRESSURE: 78 MMHG | BODY MASS INDEX: 21.14 KG/M2 | HEIGHT: 61 IN

## 2025-05-16 DIAGNOSIS — Z12.4 CERVICAL CANCER SCREENING: ICD-10-CM

## 2025-05-16 DIAGNOSIS — Z01.419 ENCOUNTER FOR ROUTINE GYNECOLOGICAL EXAMINATION WITH PAPANICOLAOU SMEAR OF CERVIX: ICD-10-CM

## 2025-05-16 DIAGNOSIS — Z01.419 ENCOUNTER FOR WELL WOMAN EXAM: Primary | ICD-10-CM

## 2025-05-16 DIAGNOSIS — Z12.31 ENCOUNTER FOR SCREENING MAMMOGRAM FOR MALIGNANT NEOPLASM OF BREAST: ICD-10-CM

## 2025-05-16 DIAGNOSIS — Z12.39 ENCOUNTER FOR SCREENING BREAST EXAMINATION: ICD-10-CM

## 2025-05-16 DIAGNOSIS — Z11.51 SCREENING FOR HUMAN PAPILLOMAVIRUS (HPV): ICD-10-CM

## 2025-05-16 NOTE — PROGRESS NOTES
":  Assessment & Plan  Encounter for routine gynecological examination with Papanicolaou smear of cervix    Orders:    IGP, Aptima HPV    Encounter for screening mammogram for malignant neoplasm of breast    Orders:    Mammo screening bilateral w 3d and cad    Encounter for well woman exam         Encounter for screening breast examination         Cervical cancer screening    Orders:    IGP, Aptima HPV    Screening for human papillomavirus (HPV)    Orders:    IGP, Aptima HPV        History of Present Illness     Elke Nation is a 44 y.o. female   Pt presents for her annual exam today--  She has no gyn complaints  She has regular bleeding   no pelvic pain  Bowel and bladder are regular  Colonoscopy--  No breast concerns today  Last mammo--24    pap today.    Rx mammo  Daily mvi  Nsaids prn      Review of Systems   Constitutional:  Negative for chills, fever and unexpected weight change.   HENT:  Negative for ear pain and sore throat.    Eyes:  Negative for pain and visual disturbance.   Respiratory:  Negative for cough and shortness of breath.    Cardiovascular:  Negative for chest pain and palpitations.   Gastrointestinal:  Negative for abdominal pain, blood in stool, constipation, diarrhea and vomiting.   Genitourinary: Negative.  Negative for dysuria and hematuria.   Musculoskeletal:  Negative for arthralgias and back pain.   Skin:  Negative for color change and rash.   Neurological:  Negative for seizures and syncope.   All other systems reviewed and are negative.    Objective   /78 (BP Location: Left arm, Patient Position: Sitting, Cuff Size: Standard)   Ht 5' 1\" (1.549 m)   Wt 50.8 kg (112 lb)   LMP 05/06/2025 (Exact Date)   BMI 21.16 kg/m²      Physical Exam  Vitals and nursing note reviewed.   Constitutional:       General: She is not in acute distress.     Appearance: Normal appearance. She is well-developed.   HENT:      Head: Normocephalic and atraumatic.     Eyes:      Conjunctiva/sclera: " Conjunctivae normal.       Cardiovascular:      Rate and Rhythm: Normal rate and regular rhythm.      Heart sounds: No murmur heard.  Pulmonary:      Effort: Pulmonary effort is normal. No respiratory distress.      Breath sounds: Normal breath sounds.   Chest:   Breasts:     Right: Normal.      Left: Normal.   Abdominal:      Palpations: Abdomen is soft.      Tenderness: There is no abdominal tenderness.   Genitourinary:     General: Normal vulva.      Vagina: Normal.      Cervix: Normal.      Uterus: Normal.       Adnexa: Right adnexa normal and left adnexa normal.      Rectum: Normal.     Musculoskeletal:         General: No swelling.      Cervical back: Neck supple.     Skin:     General: Skin is warm and dry.      Capillary Refill: Capillary refill takes less than 2 seconds.     Neurological:      Mental Status: She is alert.     Psychiatric:         Mood and Affect: Mood normal.

## 2025-05-20 LAB
CYTOLOGIST CVX/VAG CYTO: NORMAL
DX ICD CODE: NORMAL
HPV I/H RISK 4 DNA CVX QL PROBE+SIG AMP: NEGATIVE
OTHER STN SPEC: NORMAL
PATH REPORT.FINAL DX SPEC: NORMAL
SL AMB NOTE:: NORMAL
SL AMB SPECIMEN ADEQUACY: NORMAL
SL AMB TEST METHODOLOGY: NORMAL

## 2025-05-23 ENCOUNTER — RESULTS FOLLOW-UP (OUTPATIENT)
Dept: OBGYN CLINIC | Facility: CLINIC | Age: 45
End: 2025-05-23

## 2025-06-02 DIAGNOSIS — M06.4 INFLAMMATORY POLYARTHROPATHY (HCC): Primary | ICD-10-CM

## 2025-06-02 RX ORDER — METHOTREXATE 2.5 MG/1
15 TABLET ORAL WEEKLY
Qty: 32 TABLET | Refills: 1 | Status: SHIPPED | OUTPATIENT
Start: 2025-06-02

## 2025-06-02 NOTE — TELEPHONE ENCOUNTER
Last given by historic provider    Reason for call:   [x] Refill   [] Prior Auth  [] Other:     Office:   [] PCP/Provider -   [x] Specialty/Provider - rheumatology    Medication: methotrexate 2.5 MG tablet take 6 tablets once a week      Pharmacy: [facility]/pharmacy #[id number] - [place] - [id number]      Local Pharmacy   Does the patient have enough for 3 days?   [x] Yes   [] No - Send as HP to POD    Mail Away Pharmacy   Does the patient have enough for 10 days?   [] Yes   [] No - Send as HP to POD

## 2025-06-05 ENCOUNTER — RA CDI HCC (OUTPATIENT)
Dept: OTHER | Facility: HOSPITAL | Age: 45
End: 2025-06-05

## 2025-06-05 NOTE — PROGRESS NOTES
HCC coding opportunities       Chart reviewed, no opportunity found: CHART REVIEWED, NO OPPORTUNITY FOUND        Patients Insurance        Commercial Insurance: [employer]

## 2025-06-09 NOTE — PROGRESS NOTES
Name: Elke Nation      : 1980      MRN: [id number]  Encounter Provider: Laury Larson MD  Encounter Date: 2025   Encounter department: [facility]  :  Assessment & Plan  Annual physical exam  Check routine labs           Inflammatory polyarthropathy (HCC)  Followed by rheumatology                History of Present Illness   Patient here for annual physical already seen by gyn        Review of Systems   Constitutional:  Negative for appetite change, chills, fatigue and fever.   Respiratory:  Negative for cough, chest tightness and shortness of breath.    Cardiovascular:  Negative for chest pain, palpitations and leg swelling.   Gastrointestinal:  Negative for abdominal pain, constipation, diarrhea, nausea and vomiting.   Genitourinary:  Negative for difficulty urinating and frequency.   Musculoskeletal:  Negative for arthralgias, back pain, gait problem and neck pain.   Skin:  Negative for rash.   Neurological:  Negative for dizziness, weakness, light-headedness, numbness and headaches.   Hematological:  Does not bruise/bleed easily.   Psychiatric/Behavioral:  Negative for dysphoric mood and sleep disturbance. The patient is not nervous/anxious.        Objective   LMP 2025 (Exact Date)      Physical Exam  Vitals and nursing note reviewed.   Constitutional:       General: She is not in acute distress.     Appearance: Normal appearance. She is well-developed and normal weight.   HENT:      Head: Normocephalic and atraumatic.      Right Ear: Tympanic membrane, ear canal and external ear normal.      Left Ear: Tympanic membrane, ear canal and external ear normal.      Nose: Nose normal.      Mouth/Throat:      Mouth: Mucous membranes are moist.      Pharynx: Oropharynx is clear. No oropharyngeal exudate or posterior oropharyngeal erythema.     Eyes:      Extraocular Movements: Extraocular movements intact.      Conjunctiva/sclera: Conjunctivae normal.      Pupils: Pupils  are equal, round, and reactive to light.       Cardiovascular:      Rate and Rhythm: Normal rate and regular rhythm.      Pulses: Normal pulses.      Heart sounds: Normal heart sounds. No murmur heard.  Pulmonary:      Effort: Pulmonary effort is normal. No respiratory distress.      Breath sounds: Normal breath sounds. No wheezing or rales.   Abdominal:      General: Bowel sounds are normal. There is no distension.      Palpations: Abdomen is soft. There is no mass.      Tenderness: There is no abdominal tenderness. There is no right CVA tenderness, left CVA tenderness, guarding or rebound.      Hernia: No hernia is present.     Musculoskeletal:         General: No swelling or tenderness. Normal range of motion.      Cervical back: Normal range of motion and neck supple.      Right lower leg: No edema.      Left lower leg: No edema.     Skin:     General: Skin is warm and dry.      Capillary Refill: Capillary refill takes less than 2 seconds.      Findings: No rash.      Comments: No abn  moles seen     Neurological:      General: No focal deficit present.      Mental Status: She is alert and oriented to person, place, and time.      Cranial Nerves: No cranial nerve deficit.      Sensory: No sensory deficit.      Motor: No weakness.      Coordination: Coordination normal.      Gait: Gait normal.      Deep Tendon Reflexes: Reflexes normal.     Psychiatric:         Mood and Affect: Mood normal.         Behavior: Behavior normal.

## 2025-06-12 ENCOUNTER — OFFICE VISIT (OUTPATIENT)
Dept: FAMILY MEDICINE CLINIC | Facility: CLINIC | Age: 45
End: 2025-06-12
Payer: COMMERCIAL

## 2025-06-12 VITALS
OXYGEN SATURATION: 99 % | DIASTOLIC BLOOD PRESSURE: 72 MMHG | HEIGHT: 61 IN | BODY MASS INDEX: 20.96 KG/M2 | WEIGHT: 111 LBS | TEMPERATURE: 98 F | RESPIRATION RATE: 16 BRPM | HEART RATE: 58 BPM | SYSTOLIC BLOOD PRESSURE: 118 MMHG

## 2025-06-12 DIAGNOSIS — M06.4 INFLAMMATORY POLYARTHROPATHY (HCC): ICD-10-CM

## 2025-06-12 DIAGNOSIS — Z00.00 ANNUAL PHYSICAL EXAM: Primary | ICD-10-CM

## 2025-06-12 PROCEDURE — 99396 PREV VISIT EST AGE 40-64: CPT | Performed by: FAMILY MEDICINE

## 2025-06-29 DIAGNOSIS — M06.4 INFLAMMATORY POLYARTHROPATHY (HCC): ICD-10-CM

## 2025-06-30 RX ORDER — METHOTREXATE 2.5 MG/1
TABLET ORAL
Qty: 72 TABLET | Refills: 1 | Status: SHIPPED | OUTPATIENT
Start: 2025-06-30

## 2025-07-08 LAB
ALBUMIN SERPL-MCNC: 4.2 G/DL (ref 3.6–5.1)
ALBUMIN/GLOB SERPL: 1.9 (CALC) (ref 1–2.5)
ALP SERPL-CCNC: 52 U/L (ref 31–125)
ALT SERPL-CCNC: 49 U/L (ref 6–29)
APPEARANCE UR: CLEAR
AST SERPL-CCNC: 86 U/L (ref 10–30)
BACTERIA UR QL AUTO: ABNORMAL /HPF
BASOPHILS # BLD AUTO: 31 CELLS/UL (ref 0–200)
BASOPHILS NFR BLD AUTO: 0.8 %
BILIRUB SERPL-MCNC: 1.3 MG/DL (ref 0.2–1.2)
BILIRUB UR QL STRIP: NEGATIVE
BUN SERPL-MCNC: 12 MG/DL (ref 7–25)
BUN/CREAT SERPL: ABNORMAL (CALC) (ref 6–22)
CALCIUM SERPL-MCNC: 9.2 MG/DL (ref 8.6–10.2)
CHLORIDE SERPL-SCNC: 102 MMOL/L (ref 98–110)
CHOLEST SERPL-MCNC: 170 MG/DL
CHOLEST/HDLC SERPL: 2 (CALC)
CO2 SERPL-SCNC: 30 MMOL/L (ref 20–32)
COLOR UR: YELLOW
CREAT SERPL-MCNC: 0.71 MG/DL (ref 0.5–0.99)
EOSINOPHIL # BLD AUTO: 51 CELLS/UL (ref 15–500)
EOSINOPHIL NFR BLD AUTO: 1.3 %
ERYTHROCYTE [DISTWIDTH] IN BLOOD BY AUTOMATED COUNT: 12.2 % (ref 11–15)
GFR/BSA.PRED SERPLBLD CYS-BASED-ARV: 107 ML/MIN/1.73M2
GLOBULIN SER CALC-MCNC: 2.2 G/DL (CALC) (ref 1.9–3.7)
GLUCOSE SERPL-MCNC: 92 MG/DL (ref 65–99)
GLUCOSE UR QL STRIP: NEGATIVE
HBA1C MFR BLD: 5 %
HCT VFR BLD AUTO: 42.9 % (ref 35–45)
HDLC SERPL-MCNC: 86 MG/DL
HGB BLD-MCNC: 14.2 G/DL (ref 11.7–15.5)
HGB UR QL STRIP: NEGATIVE
HYALINE CASTS #/AREA URNS LPF: ABNORMAL /LPF
KETONES UR QL STRIP: NEGATIVE
LDLC SERPL CALC-MCNC: 71 MG/DL (CALC)
LEUKOCYTE ESTERASE UR QL STRIP: NEGATIVE
LYMPHOCYTES # BLD AUTO: 1135 CELLS/UL (ref 850–3900)
LYMPHOCYTES NFR BLD AUTO: 29.1 %
MCH RBC QN AUTO: 31.6 PG (ref 27–33)
MCHC RBC AUTO-ENTMCNC: 33.1 G/DL (ref 32–36)
MCV RBC AUTO: 95.5 FL (ref 80–100)
MONOCYTES # BLD AUTO: 433 CELLS/UL (ref 200–950)
MONOCYTES NFR BLD AUTO: 11.1 %
NEUTROPHILS # BLD AUTO: 2250 CELLS/UL (ref 1500–7800)
NEUTROPHILS NFR BLD AUTO: 57.7 %
NITRITE UR QL STRIP: NEGATIVE
NONHDLC SERPL-MCNC: 84 MG/DL (CALC)
PH UR STRIP: 6.5 [PH] (ref 5–8)
PLATELET # BLD AUTO: 206 THOUSAND/UL (ref 140–400)
PMV BLD REES-ECKER: 10.4 FL (ref 7.5–12.5)
POTASSIUM SERPL-SCNC: 3.9 MMOL/L (ref 3.5–5.3)
PROT SERPL-MCNC: 6.4 G/DL (ref 6.1–8.1)
PROT UR QL STRIP: NEGATIVE
RBC # BLD AUTO: 4.49 MILLION/UL (ref 3.8–5.1)
RBC #/AREA URNS HPF: ABNORMAL /HPF
SODIUM SERPL-SCNC: 138 MMOL/L (ref 135–146)
SP GR UR STRIP: 1.02 (ref 1–1.03)
SQUAMOUS #/AREA URNS HPF: ABNORMAL /HPF
TRIGL SERPL-MCNC: 45 MG/DL
TSH SERPL-ACNC: 0.82 MIU/L
WBC # BLD AUTO: 3.9 THOUSAND/UL (ref 3.8–10.8)
WBC #/AREA URNS HPF: ABNORMAL /HPF

## 2025-08-15 ENCOUNTER — TELEPHONE (OUTPATIENT)
Age: 45
End: 2025-08-15